# Patient Record
Sex: MALE | Race: WHITE | NOT HISPANIC OR LATINO | Employment: FULL TIME
[De-identification: names, ages, dates, MRNs, and addresses within clinical notes are randomized per-mention and may not be internally consistent; named-entity substitution may affect disease eponyms.]

---

## 2022-03-25 ENCOUNTER — TRANSCRIBE ORDERS (OUTPATIENT)
Dept: OTHER | Age: 67
End: 2022-03-25
Payer: COMMERCIAL

## 2022-03-25 DIAGNOSIS — G89.29 CHRONIC LOW BACK PAIN: Primary | ICD-10-CM

## 2022-03-25 DIAGNOSIS — M54.50 CHRONIC LOW BACK PAIN: Primary | ICD-10-CM

## 2022-03-25 DIAGNOSIS — Z98.890 S/P RIGHT ROTATOR CUFF REPAIR: ICD-10-CM

## 2022-04-06 ENCOUNTER — THERAPY VISIT (OUTPATIENT)
Dept: PHYSICAL THERAPY | Facility: CLINIC | Age: 67
End: 2022-04-06
Payer: COMMERCIAL

## 2022-04-06 DIAGNOSIS — M54.50 CHRONIC LOW BACK PAIN: ICD-10-CM

## 2022-04-06 DIAGNOSIS — G89.29 CHRONIC LOW BACK PAIN: ICD-10-CM

## 2022-04-06 DIAGNOSIS — Z98.890 S/P RIGHT ROTATOR CUFF REPAIR: ICD-10-CM

## 2022-04-06 PROCEDURE — 97161 PT EVAL LOW COMPLEX 20 MIN: CPT | Mod: GP | Performed by: PHYSICAL THERAPIST

## 2022-04-06 PROCEDURE — 97110 THERAPEUTIC EXERCISES: CPT | Mod: GP | Performed by: PHYSICAL THERAPIST

## 2022-04-06 PROCEDURE — 97140 MANUAL THERAPY 1/> REGIONS: CPT | Mod: GP | Performed by: PHYSICAL THERAPIST

## 2022-04-06 NOTE — PROGRESS NOTES
Physical Therapy Initial Evaluation  Subjective:    Patient Health History  Devang English being seen for Back and bilateral leg pain.     Date of Onset: 3 months ago.   Problem occurred: unknown   Pain is reported as 4/10 on pain scale.  General health as reported by patient is fair.  Pertinent medical history includes: fibromyalgia.   Red flags:  None as reported by patient.  Medical allergies: none.   Surgeries include:  Orthopedic surgery. Other surgery history details: right hip fx/plate; back; rotator cuff.    Current medications:  Anti-inflammatory and muscle relaxants. Other medications details: Flomax.    Current occupation is Professor.   Primary job tasks include:  Prolonged standing and driving.                  Therapist Generated HPI Evaluation  Problem details: Patient has chief complaint of bilateral low lumbar pain that radiates intermittently into bilateral buttocks and posterior thighs to knee 3 months ago of insidious onset. He had right RTC repair surgery on Dec 3, 2021, and thinks it may be due to decreased activity post surgery. History of longstanding low back pain with L5-S1 discectomy many years ago; many injections over the past 5 years; fibromyalgia; No numbness or tingling. No leg weakness.  Goals: walk 2-3 miles; golf; ride road bike .         Type of problem:  Lumbar.    This is a recurrent condition.  Condition occurred with:  Insidious onset.    Patient reports pain:  Lumbar spine left and lumbar spine right.  Pain is described as aching and shooting and is intermittent.  Pain radiates to:  Gluteals left, gluteals right, thigh left and thigh right.   Since onset symptoms are gradually improving.  Associated symptoms:  Loss of motion/stiffness. Symptoms are exacerbated by bending, standing, walking, lifting and sitting  and relieved by NSAID's.      Restrictions due to condition include:  Working in normal job without restrictions.  Barriers include:  None as reported by  patient.                        Objective:    Gait:    Gait Type:  Antalgic   Assistive Devices:  None      Flexibility/Screens:   Positive screens:  LumbarNegative screens: Hip     Lower Extremity:  Decreased left lower extremity flexibility:Piriformis; Hamstrings and Gastroc    Decreased right lower extremity flexibility:  Piriformis; Hamstrings and Gastroc               Lumbar/SI Evaluation  ROM:    AROM Lumbar:   Flexion:            70% ERP  Ext:                    50%   Side Bend:        Left:  60%    Right:  60%  Rotation:           Left:  80%    Right:  80%  Side Glide:        Left:     Right:           Lumbar Myotomes:  normal                  Neural Tension/Mobility:  Neural tension wnl lumbar: Severe loss of ROM with SLR and SLR with DF tests.      Left side:SLR; SLR w/DF or Slump  negative.     Right side:   SLR w/DF; Slump or SLR  negative.   Lumbar Palpation:    Tenderness present at Left:    Piriformis and Hamstrings  Tenderness present at Right: Piriformis and Hamstrings                                                     General     ROS    Assessment/Plan:    Patient is a 66 year old male with lumbar complaints.    Patient has the following significant findings with corresponding treatment plan.                Diagnosis 1:  Lumbar radiculopathy  Pain -  education  Decreased ROM/flexibility - manual therapy, therapeutic exercise and home program  Decreased strength - therapeutic exercise, therapeutic activities and home program  Impaired gait - gait training    Therapy Evaluation Codes:   1) History comprised of:   Personal factors that impact the plan of care:      None.    Comorbidity factors that impact the plan of care are:      None.     Medications impacting care: None.  2) Examination of Body Systems comprised of:   Body structures and functions that impact the plan of care:      Lumbar spine.   Activity limitations that impact the plan of care are:      Sitting, Standing, Walking and  Working.  3) Clinical presentation characteristics are:   Stable/Uncomplicated.  4) Decision-Making    Low complexity using standardized patient assessment instrument and/or measureable assessment of functional outcome.  Cumulative Therapy Evaluation is: Low complexity.    Previous and current functional limitations:  (See Goal Flow Sheet for this information)    Short term and Long term goals: (See Goal Flow Sheet for this information)     Communication ability:  Patient appears to be able to clearly communicate and understand verbal and written communication and follow directions correctly.  Treatment Explanation - The following has been discussed with the patient:   RX ordered/plan of care  Anticipated outcomes  Possible risks and side effects  This patient would benefit from PT intervention to resume normal activities.   Rehab potential is excellent.    Frequency:  1 X week, once daily  Duration:  for 6 weeks  Discharge Plan:  Achieve all LTG.  Independent in home treatment program.  Reach maximal therapeutic benefit.    Please refer to the daily flowsheet for treatment today, total treatment time and time spent performing 1:1 timed codes.

## 2022-04-06 NOTE — LETTER
[facility]  [address] [facility] [address]  [phone]    2022    Re: Devang English   :   1955  MRN:  [id number]   REFERRING PHYSICIAN:   Jose ONEIL [facility]    Date of Initial Evaluation: 22  Visits:  Rxs Used: 1  Reason for Referral:     Chronic low back pain  S/P right rotator cuff repair    EVALUATION SUMMARY    Physical Therapy Initial Evaluation  Subjective:    Patient Health History  Devang English being seen for Back and bilateral leg pain.     Date of Onset: 3 months ago.   Problem occurred: unknown   Pain is reported as 4/10 on pain scale.  General health as reported by patient is fair.  Pertinent medical history includes: fibromyalgia.   Red flags:  None as reported by patient.  Medical allergies: none.   Surgeries include:  Orthopedic surgery. Other surgery history details: right hip fx/plate; back; rotator cuff.    Current medications:  Anti-inflammatory and muscle relaxants. Other medications details: Flomax.    Current occupation is Professor.   Primary job tasks include:  Prolonged standing and driving.                  Therapist Generated HPI Evaluation  Problem details: Patient has chief complaint of bilateral low lumbar pain that radiates intermittently into bilateral buttocks and posterior thighs to knee 3 months ago of insidious onset. He had right RTC repair surgery on Dec 3, 2021, and thinks it may be due to decreased activity post surgery. History of longstanding low back pain with L5-S1 discectomy many years ago; many injections over the past 5 years; fibromyalgia; No numbness or tingling. No leg weakness.  Goals: walk 2-3 miles; golf; ride road bike .         Type of problem:  Lumbar.    This is a recurrent condition.  Condition occurred with:  Insidious onset.    Patient reports pain:  Lumbar spine left and lumbar spine right.  Pain is described as aching and shooting  and is intermittent.  Pain radiates to:  Gluteals left, gluteals right, thigh left and thigh right.   Since onset symptoms are gradually improving.  Associated symptoms:  Loss of motion/stiffness. Symptoms are exacerbated by bending, standing, walking, lifting and sitting  and relieved by NSAID's.      Restrictions due to condition include:  Working in normal job without restrictions.  Barriers include:  None as reported by patient.      Objective:    Gait:    Gait Type:  Antalgic   Assistive Devices:  None    Flexibility/Screens:   Positive screens:  LumbarNegative screens: Hip     Lower Extremity:  Decreased left lower extremity flexibility:Piriformis; Hamstrings and Gastroc    Decreased right lower extremity flexibility:  Piriformis; Hamstrings and Gastroc       Lumbar/SI Evaluation  ROM:    AROM Lumbar:   Flexion:            70% ERP  Ext:                    50%   Side Bend:        Left:  60%    Right:  60%  Rotation:           Left:  80%    Right:  80%  Side Glide:        Left:     Right:           Lumbar Myotomes:  normal    Neural Tension/Mobility:  Neural tension wnl lumbar: Severe loss of ROM with SLR and SLR with DF tests.      Left side:SLR; SLR w/DF or Slump  negative.     Right side:   SLR w/DF; Slump or SLR  negative.   Lumbar Palpation:    Tenderness present at Left:    Piriformis and Hamstrings  Tenderness present at Right: Piriformis and Hamstrings    Assessment/Plan:    Patient is a 66 year old male with lumbar complaints.    Patient has the following significant findings with corresponding treatment plan.                Diagnosis 1:  Lumbar radiculopathy  Pain -  education  Decreased ROM/flexibility - manual therapy, therapeutic exercise and home program  Decreased strength - therapeutic exercise, therapeutic activities and home program  Impaired gait - gait training    Therapy Evaluation Codes:   1) History comprised of:   Personal factors that impact the plan of care:      None.    Comorbidity  factors that impact the plan of care are:      None.     Medications impacting care: None.  2) Examination of Body Systems comprised of:   Body structures and functions that impact the plan of care:      Lumbar spine.   Activity limitations that impact the plan of care are:      Sitting, Standing, Walking and Working.  3) Clinical presentation characteristics are:   Stable/Uncomplicated.  4) Decision-Making    Low complexity using standardized patient assessment instrument and/or measureable assessment of functional outcome.  Cumulative Therapy Evaluation is: Low complexity.    Previous and current functional limitations:  (See Goal Flow Sheet for this information)    Short term and Long term goals: (See Goal Flow Sheet for this information)     Communication ability:  Patient appears to be able to clearly communicate and understand verbal and written communication and follow directions correctly.  Treatment Explanation - The following has been discussed with the patient:   RX ordered/plan of care  Anticipated outcomes  Possible risks and side effects  This patient would benefit from PT intervention to resume normal activities.   Rehab potential is excellent.    Frequency:  1 X week, once daily  Duration:  for 6 weeks  Discharge Plan:  Achieve all LTG.  Independent in home treatment program.  Reach maximal therapeutic benefit.    Thank you for your referral.    INQUIRIES  Therapist: LISSET AVILES [facility]  [address] [facility] [address]  Phone: [phone]  Fax: [phone]

## 2022-04-13 ENCOUNTER — THERAPY VISIT (OUTPATIENT)
Dept: PHYSICAL THERAPY | Facility: CLINIC | Age: 67
End: 2022-04-13
Payer: COMMERCIAL

## 2022-04-13 DIAGNOSIS — M54.50 CHRONIC LOW BACK PAIN: Primary | ICD-10-CM

## 2022-04-13 DIAGNOSIS — G89.29 CHRONIC LOW BACK PAIN: Primary | ICD-10-CM

## 2022-04-13 PROCEDURE — 97110 THERAPEUTIC EXERCISES: CPT | Mod: GP | Performed by: PHYSICAL THERAPIST

## 2022-04-13 PROCEDURE — 97140 MANUAL THERAPY 1/> REGIONS: CPT | Mod: GP | Performed by: PHYSICAL THERAPIST

## 2022-04-20 ENCOUNTER — THERAPY VISIT (OUTPATIENT)
Dept: PHYSICAL THERAPY | Facility: CLINIC | Age: 67
End: 2022-04-20
Payer: COMMERCIAL

## 2022-04-20 DIAGNOSIS — G89.29 CHRONIC LOW BACK PAIN: Primary | ICD-10-CM

## 2022-04-20 DIAGNOSIS — M54.50 CHRONIC LOW BACK PAIN: Primary | ICD-10-CM

## 2022-04-20 PROCEDURE — 97140 MANUAL THERAPY 1/> REGIONS: CPT | Mod: GP | Performed by: PHYSICAL THERAPIST

## 2022-04-20 NOTE — PROGRESS NOTES
Subjective:  HPI  Physical Exam  Oswestry Score: 40 %                 Objective:  System    Physical Exam    General     ROS    Assessment/Plan:    DISCHARGE REPORT    Progress reporting period is from 4/6/2022 to 4/20/2022.     SUBJECTIVE    Subjective: Gradual improvement in back and hip pain. Yoga classes and home PT stretches helpful.   Current Pain level: 1/10   Initial Pain level: 4/10     OBJECTIVE  Objective: Significant decrease in tightness in right piriformis mm. Home stretches and deep self massage with golf ball helpful.      ASSESSMENT/PLAN    STG/LTGs have been met or progress has been made towards goals:  Yes (See Goal flow sheet completed today.)  Assessment of Progress: The patient's condition is improving.  The patient's condition has potential to improve.  Self Management Plans:  Patient is independent in a home treatment program.  Patient is independent in self management of symptoms.    Devang continues to require the following intervention to meet STG and LTG's: PT intervention is no longer required to meet STG/LTG.    Recommendations:    This patient is ready to be discharged from therapy and continue their home treatment program.    Please refer to the daily flowsheet for treatment today, total treatment time and time spent performing 1:1 timed codes.

## 2022-04-20 NOTE — LETTER
[facility]  [address] [facility] [address] [phone]    2022    Re: Devang English   :   1955  MRN:  [id number]   REFERRING PHYSICIAN:   Jose ONEIL [facility]    Date of Initial Evaluation: 22  Visits:  Rxs Used: 3  Reason for Referral:  Chronic low back pain    EVALUATION SUMMARY    Subjective:  HPI  Physical Exam  Oswestry Score: 40 %                 Assessment/Plan:    DISCHARGE REPORT    Progress reporting period is from 2022 to 2022.     SUBJECTIVE    Subjective: Gradual improvement in back and hip pain. Yoga classes and home PT stretches helpful.   Current Pain level: 1/10   Initial Pain level: 4/10     OBJECTIVE  Objective: Significant decrease in tightness in right piriformis mm. Home stretches and deep self massage with golf ball helpful.      ASSESSMENT/PLAN    STG/LTGs have been met or progress has been made towards goals:  Yes (See Goal flow sheet completed today.)  Assessment of Progress: The patient's condition is improving.  The patient's condition has potential to improve.  Self Management Plans:  Patient is independent in a home treatment program.  Patient is independent in self management of symptoms.    Devang continues to require the following intervention to meet STG and LTG's: PT intervention is no longer required to meet STG/LTG.  Re: Devang English   :   1955    Recommendations:    This patient is ready to be discharged from therapy and continue their home treatment program.    Thank you for your referral.    INQUIRIES  Therapist: LISSET AVILES [facility]  [id number] [place] [address]  Phone: [phone]  Fax: [phone]

## 2024-12-02 RX ORDER — ALPRAZOLAM 0.25 MG
0.25 TABLET ORAL 3 TIMES DAILY PRN
Status: ON HOLD | COMMUNITY
End: 2025-04-22

## 2024-12-02 RX ORDER — PRAMIPEXOLE DIHYDROCHLORIDE 0.25 MG/1
.25-.5 TABLET ORAL
COMMUNITY

## 2024-12-02 RX ORDER — LOSARTAN POTASSIUM 50 MG/1
50 TABLET ORAL DAILY
Status: ON HOLD | COMMUNITY
End: 2025-04-22

## 2024-12-02 RX ORDER — ROSUVASTATIN CALCIUM 40 MG/1
40 TABLET, COATED ORAL AT BEDTIME
COMMUNITY

## 2024-12-02 RX ORDER — TAMSULOSIN HYDROCHLORIDE 0.4 MG/1
0.8 CAPSULE ORAL EVERY EVENING
COMMUNITY

## 2024-12-02 RX ORDER — GABAPENTIN 300 MG/1
600 CAPSULE ORAL 2 TIMES DAILY
Status: ON HOLD | COMMUNITY
End: 2025-04-23

## 2024-12-02 RX ORDER — SERTRALINE HYDROCHLORIDE 100 MG/1
100 TABLET, FILM COATED ORAL DAILY
COMMUNITY

## 2025-03-27 NOTE — PROGRESS NOTES
Discharge plan according to [facility]:     12/02/24 7751   Discharge Planning   Patient/Family Anticipates Transition to home with family   Living Arrangements   People in Home spouse   Type of Residence Private Residence   Is your private residence a single family home or apartment? Single family home   Number of Stairs, Within Home, Primary none   Bathroom Shower/Tub Walk-in shower   Equipment Currently Used at Home none   Support System   Support Systems Spouse/Significant Other   Do you have someone available to stay with you one or two nights once you are home? Yes
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
## 2025-04-17 RX ORDER — IBUPROFEN 200 MG
200 TABLET ORAL EVERY 4 HOURS PRN
Status: ON HOLD | COMMUNITY
End: 2025-04-23

## 2025-04-17 RX ORDER — OXYCODONE HYDROCHLORIDE 5 MG/1
5 CAPSULE ORAL EVERY 4 HOURS PRN
Status: ON HOLD | COMMUNITY
End: 2025-04-22

## 2025-04-18 NOTE — PROGRESS NOTES
I am evaluating this patient for upcoming Right Posterior Total Hip Arthroplasty with Dr. Jones at [facility] on 4/22/25:    -Reviewed preop H&P and recent labs. Cleared by PCP for surgery. Medical history significant for:    1) Sinus Bradycardia: Not new. EKG at preop exam on 4/9/25 showed sinus bradycardia.  Right bundle branch block. No significant change from previous EKG. Patient does have history of bradycardia with heart rates commonly and 40-60 bpm range.  Denies any chest pain/chest discomfort, CRAMER, shortness of breath, palpitations, dizziness/lightheadedness.  Functional capacity >4 METS.  Cleared by PCP for surgery without further testing or evaluation.  2) Hyperlipidemia: On rosuvastatin.  3) Hypertension: Appears relatively well-controlled on losartan.  Patient was instructed to hold losartan on the morning of surgery.  4) Chronic Low Back Pain and Fibromyalgia: S/P Spinal Stimulator placement: On ibuprofen, gabapentin and oxycodone.  I will order Inpatient Pain Team Consult to assist with post-op pain management plan given this history. Their recommendations and assistance are greatly appreciated.     5) Restless Leg Syndrome: On pramipexole.  6) Anxiety: On alprazolam.  7) Benign Prostatic Hyperplasia (BPH): On tamsulosin.  May have increased risk for postop urinary retention.  I recommend continuing on tamsulosin perioperatively and monitoring for urine retention with bladder scanning as needed.     Patient appears medically optimized for surgery.  Discharge plan below.    Discharge plan according to [facility]:    Home morning of POD 1 with assist of Spouse     12/02/24 4240   Discharge Planning   Patient/Family Anticipates Transition to home with family   Living Arrangements   People in Home spouse   Type of Residence Private Residence   Is your private residence a single family home or apartment? Single family home   Number of Stairs, Within Home, Primary none   Bathroom Shower/Tub  Walk-in shower   Equipment Currently Used at Home none   Support System   Support Systems Spouse/Significant Other   Do you have someone available to stay with you one or two nights once you are home? Yes       MARIE Samayoa, CNP   Advanced Practice Nurse Navigator- Orthopedics  [facility]   Office Phone: [phone]  Direct Fax: [phone]

## 2025-04-21 ENCOUNTER — ANESTHESIA EVENT (OUTPATIENT)
Dept: SURGERY | Facility: CLINIC | Age: 70
End: 2025-04-21
Payer: COMMERCIAL

## 2025-04-22 ENCOUNTER — APPOINTMENT (OUTPATIENT)
Dept: RADIOLOGY | Facility: CLINIC | Age: 70
End: 2025-04-22
Attending: ORTHOPAEDIC SURGERY
Payer: COMMERCIAL

## 2025-04-22 ENCOUNTER — APPOINTMENT (OUTPATIENT)
Dept: PHYSICAL THERAPY | Facility: CLINIC | Age: 70
End: 2025-04-22
Attending: ORTHOPAEDIC SURGERY
Payer: COMMERCIAL

## 2025-04-22 ENCOUNTER — HOSPITAL ENCOUNTER (OUTPATIENT)
Facility: CLINIC | Age: 70
Discharge: HOME | End: 2025-04-23
Attending: ORTHOPAEDIC SURGERY | Admitting: ORTHOPAEDIC SURGERY
Payer: COMMERCIAL

## 2025-04-22 ENCOUNTER — ANESTHESIA (OUTPATIENT)
Dept: SURGERY | Facility: CLINIC | Age: 70
End: 2025-04-22
Payer: COMMERCIAL

## 2025-04-22 DIAGNOSIS — Z98.890 HISTORY OF HIP SURGERY: Primary | ICD-10-CM

## 2025-04-22 DIAGNOSIS — M79.7 FIBROMYALGIA: ICD-10-CM

## 2025-04-22 DIAGNOSIS — M16.51 POST-TRAUMATIC OSTEOARTHRITIS OF RIGHT HIP: ICD-10-CM

## 2025-04-22 PROBLEM — M16.50: Status: ACTIVE | Noted: 2025-04-22

## 2025-04-22 PROBLEM — I10 ESSENTIAL HYPERTENSION: Status: ACTIVE | Noted: 2024-12-17

## 2025-04-22 PROBLEM — E29.1 MALE HYPOGONADISM: Status: ACTIVE | Noted: 2023-11-08

## 2025-04-22 PROBLEM — G47.00 INSOMNIA: Status: ACTIVE | Noted: 2020-05-27

## 2025-04-22 PROBLEM — F43.22 ADJUSTMENT DISORDER WITH ANXIETY: Status: ACTIVE | Noted: 2023-10-16

## 2025-04-22 PROBLEM — F41.9 ANXIETY: Status: ACTIVE | Noted: 2020-05-27

## 2025-04-22 PROBLEM — G25.81 RESTLESS LEG SYNDROME: Status: ACTIVE | Noted: 2020-05-27

## 2025-04-22 PROBLEM — F33.1 MODERATE EPISODE OF RECURRENT MAJOR DEPRESSIVE DISORDER (H): Status: ACTIVE | Noted: 2022-02-14

## 2025-04-22 PROBLEM — N40.1 BENIGN LOCALIZED PROSTATIC HYPERPLASIA WITH LOWER URINARY TRACT SYMPTOMS (LUTS): Status: ACTIVE | Noted: 2023-11-08

## 2025-04-22 PROBLEM — R79.89 ELEVATED TSH: Status: ACTIVE | Noted: 2023-11-08

## 2025-04-22 PROBLEM — J30.9 ALLERGIC RHINITIS: Status: ACTIVE | Noted: 2025-04-22

## 2025-04-22 PROBLEM — E78.5 HYPERLIPIDEMIA WITH TARGET LOW DENSITY LIPOPROTEIN (LDL) CHOLESTEROL LESS THAN 70 MG/DL: Status: ACTIVE | Noted: 2022-07-19

## 2025-04-22 PROBLEM — N41.1 CHRONIC PROSTATITIS: Status: ACTIVE | Noted: 2019-02-03

## 2025-04-22 PROCEDURE — 258N000003 HC RX IP 258 OP 636: Performed by: NURSE ANESTHETIST, CERTIFIED REGISTERED

## 2025-04-22 PROCEDURE — 97161 PT EVAL LOW COMPLEX 20 MIN: CPT | Mod: GP

## 2025-04-22 PROCEDURE — 250N000011 HC RX IP 250 OP 636: Performed by: NURSE ANESTHETIST, CERTIFIED REGISTERED

## 2025-04-22 PROCEDURE — 250N000011 HC RX IP 250 OP 636: Mod: JZ | Performed by: ORTHOPAEDIC SURGERY

## 2025-04-22 PROCEDURE — 250N000013 HC RX MED GY IP 250 OP 250 PS 637: Performed by: ORTHOPAEDIC SURGERY

## 2025-04-22 PROCEDURE — 258N000003 HC RX IP 258 OP 636: Performed by: STUDENT IN AN ORGANIZED HEALTH CARE EDUCATION/TRAINING PROGRAM

## 2025-04-22 PROCEDURE — 370N000017 HC ANESTHESIA TECHNICAL FEE, PER MIN: Performed by: ORTHOPAEDIC SURGERY

## 2025-04-22 PROCEDURE — 999N000065 XR PELVIS AND HIP PORTABLE RIGHT 1 VIEW

## 2025-04-22 PROCEDURE — 99204 OFFICE O/P NEW MOD 45 MIN: CPT | Performed by: FAMILY MEDICINE

## 2025-04-22 PROCEDURE — 250N000013 HC RX MED GY IP 250 OP 250 PS 637: Performed by: PHYSICIAN ASSISTANT

## 2025-04-22 PROCEDURE — 250N000009 HC RX 250: Performed by: NURSE ANESTHETIST, CERTIFIED REGISTERED

## 2025-04-22 PROCEDURE — 999N000141 HC STATISTIC PRE-PROCEDURE NURSING ASSESSMENT: Performed by: ORTHOPAEDIC SURGERY

## 2025-04-22 PROCEDURE — 250N000011 HC RX IP 250 OP 636: Mod: JZ | Performed by: PHYSICIAN ASSISTANT

## 2025-04-22 PROCEDURE — 250N000011 HC RX IP 250 OP 636: Performed by: PHYSICIAN ASSISTANT

## 2025-04-22 PROCEDURE — 272N000001 HC OR GENERAL SUPPLY STERILE: Performed by: ORTHOPAEDIC SURGERY

## 2025-04-22 PROCEDURE — P9045 ALBUMIN (HUMAN), 5%, 250 ML: HCPCS | Performed by: NURSE ANESTHETIST, CERTIFIED REGISTERED

## 2025-04-22 PROCEDURE — 97110 THERAPEUTIC EXERCISES: CPT | Mod: GP

## 2025-04-22 PROCEDURE — 99204 OFFICE O/P NEW MOD 45 MIN: CPT | Performed by: PHYSICIAN ASSISTANT

## 2025-04-22 PROCEDURE — 250N000011 HC RX IP 250 OP 636: Mod: JW | Performed by: STUDENT IN AN ORGANIZED HEALTH CARE EDUCATION/TRAINING PROGRAM

## 2025-04-22 PROCEDURE — 360N000077 HC SURGERY LEVEL 4, PER MIN: Performed by: ORTHOPAEDIC SURGERY

## 2025-04-22 PROCEDURE — 258N000003 HC RX IP 258 OP 636: Performed by: PHYSICIAN ASSISTANT

## 2025-04-22 PROCEDURE — C1776 JOINT DEVICE (IMPLANTABLE): HCPCS | Performed by: ORTHOPAEDIC SURGERY

## 2025-04-22 PROCEDURE — 258N000003 HC RX IP 258 OP 636: Performed by: ORTHOPAEDIC SURGERY

## 2025-04-22 PROCEDURE — 258N000001 HC RX 258: Performed by: ORTHOPAEDIC SURGERY

## 2025-04-22 PROCEDURE — 999N000063 XR HIP RIGHT 1 VIEW: Mod: RT

## 2025-04-22 PROCEDURE — 250N000009 HC RX 250: Performed by: STUDENT IN AN ORGANIZED HEALTH CARE EDUCATION/TRAINING PROGRAM

## 2025-04-22 PROCEDURE — 710N000010 HC RECOVERY PHASE 1, LEVEL 2, PER MIN: Performed by: ORTHOPAEDIC SURGERY

## 2025-04-22 PROCEDURE — 250N000013 HC RX MED GY IP 250 OP 250 PS 637: Performed by: FAMILY MEDICINE

## 2025-04-22 PROCEDURE — 97116 GAIT TRAINING THERAPY: CPT | Mod: GP

## 2025-04-22 DEVICE — CERAMIC V40 FEMORAL HEAD
Type: IMPLANTABLE DEVICE | Site: HIP | Status: FUNCTIONAL
Brand: BIOLOX

## 2025-04-22 DEVICE — 0 DEGREE POLYETHYLENE INSERT
Type: IMPLANTABLE DEVICE | Site: HIP | Status: FUNCTIONAL
Brand: TRIDENT

## 2025-04-22 DEVICE — TRIDENT II TRITANIUM CLUSTER 52E
Type: IMPLANTABLE DEVICE | Site: HIP | Status: FUNCTIONAL
Brand: TRIDENT II

## 2025-04-22 DEVICE — HIP STEM - HIGH OFFSET
Type: IMPLANTABLE DEVICE | Site: HIP | Status: FUNCTIONAL
Brand: INSIGNIA

## 2025-04-22 RX ORDER — HYDRALAZINE HYDROCHLORIDE 20 MG/ML
2.5-5 INJECTION INTRAMUSCULAR; INTRAVENOUS EVERY 10 MIN PRN
Status: DISCONTINUED | OUTPATIENT
Start: 2025-04-22 | End: 2025-04-22 | Stop reason: HOSPADM

## 2025-04-22 RX ORDER — AMLODIPINE BESYLATE 5 MG/1
5 TABLET ORAL DAILY
Status: DISCONTINUED | OUTPATIENT
Start: 2025-04-23 | End: 2025-04-23 | Stop reason: HOSPADM

## 2025-04-22 RX ORDER — ONDANSETRON 4 MG/1
4 TABLET, ORALLY DISINTEGRATING ORAL EVERY 6 HOURS PRN
Status: DISCONTINUED | OUTPATIENT
Start: 2025-04-22 | End: 2025-04-23 | Stop reason: HOSPADM

## 2025-04-22 RX ORDER — NALOXONE HYDROCHLORIDE 0.4 MG/ML
0.4 INJECTION, SOLUTION INTRAMUSCULAR; INTRAVENOUS; SUBCUTANEOUS
Status: DISCONTINUED | OUTPATIENT
Start: 2025-04-22 | End: 2025-04-23 | Stop reason: HOSPADM

## 2025-04-22 RX ORDER — KETOROLAC TROMETHAMINE 15 MG/ML
15 INJECTION, SOLUTION INTRAMUSCULAR; INTRAVENOUS EVERY 6 HOURS
Status: COMPLETED | OUTPATIENT
Start: 2025-04-22 | End: 2025-04-23

## 2025-04-22 RX ORDER — DEXAMETHASONE SODIUM PHOSPHATE 10 MG/ML
INJECTION, SOLUTION INTRAMUSCULAR; INTRAVENOUS PRN
Status: DISCONTINUED | OUTPATIENT
Start: 2025-04-22 | End: 2025-04-22

## 2025-04-22 RX ORDER — LIDOCAINE 40 MG/G
CREAM TOPICAL
Status: DISCONTINUED | OUTPATIENT
Start: 2025-04-22 | End: 2025-04-23 | Stop reason: HOSPADM

## 2025-04-22 RX ORDER — TAMSULOSIN HYDROCHLORIDE 0.4 MG/1
0.8 CAPSULE ORAL EVERY EVENING
Status: DISCONTINUED | OUTPATIENT
Start: 2025-04-22 | End: 2025-04-23 | Stop reason: HOSPADM

## 2025-04-22 RX ORDER — FENTANYL CITRATE 50 UG/ML
25 INJECTION, SOLUTION INTRAMUSCULAR; INTRAVENOUS EVERY 5 MIN PRN
Status: DISCONTINUED | OUTPATIENT
Start: 2025-04-22 | End: 2025-04-22 | Stop reason: HOSPADM

## 2025-04-22 RX ORDER — ONDANSETRON 2 MG/ML
4 INJECTION INTRAMUSCULAR; INTRAVENOUS EVERY 30 MIN PRN
Status: CANCELLED | OUTPATIENT
Start: 2025-04-22

## 2025-04-22 RX ORDER — DEXAMETHASONE SODIUM PHOSPHATE 4 MG/ML
4 INJECTION, SOLUTION INTRA-ARTICULAR; INTRALESIONAL; INTRAMUSCULAR; INTRAVENOUS; SOFT TISSUE
Status: DISCONTINUED | OUTPATIENT
Start: 2025-04-22 | End: 2025-04-22 | Stop reason: HOSPADM

## 2025-04-22 RX ORDER — GLYCOPYRROLATE 0.2 MG/ML
INJECTION, SOLUTION INTRAMUSCULAR; INTRAVENOUS PRN
Status: DISCONTINUED | OUTPATIENT
Start: 2025-04-22 | End: 2025-04-22

## 2025-04-22 RX ORDER — HYDROMORPHONE HCL IN WATER/PF 6 MG/30 ML
0.4 PATIENT CONTROLLED ANALGESIA SYRINGE INTRAVENOUS EVERY 5 MIN PRN
Status: DISCONTINUED | OUTPATIENT
Start: 2025-04-22 | End: 2025-04-22 | Stop reason: HOSPADM

## 2025-04-22 RX ORDER — AMLODIPINE BESYLATE 5 MG/1
5 TABLET ORAL DAILY
COMMUNITY

## 2025-04-22 RX ORDER — TRANEXAMIC ACID 650 MG/1
1950 TABLET ORAL ONCE
Status: COMPLETED | OUTPATIENT
Start: 2025-04-22 | End: 2025-04-22

## 2025-04-22 RX ORDER — NALOXONE HYDROCHLORIDE 0.4 MG/ML
0.1 INJECTION, SOLUTION INTRAMUSCULAR; INTRAVENOUS; SUBCUTANEOUS
Status: DISCONTINUED | OUTPATIENT
Start: 2025-04-22 | End: 2025-04-22 | Stop reason: HOSPADM

## 2025-04-22 RX ORDER — PRAMIPEXOLE DIHYDROCHLORIDE 0.25 MG/1
.25-.5 TABLET ORAL
Status: DISCONTINUED | OUTPATIENT
Start: 2025-04-22 | End: 2025-04-23 | Stop reason: HOSPADM

## 2025-04-22 RX ORDER — HYDROMORPHONE HCL IN WATER/PF 6 MG/30 ML
0.1 PATIENT CONTROLLED ANALGESIA SYRINGE INTRAVENOUS EVERY 4 HOURS PRN
Status: DISCONTINUED | OUTPATIENT
Start: 2025-04-22 | End: 2025-04-22

## 2025-04-22 RX ORDER — PROPOFOL 10 MG/ML
INJECTION, EMULSION INTRAVENOUS CONTINUOUS PRN
Status: DISCONTINUED | OUTPATIENT
Start: 2025-04-22 | End: 2025-04-22

## 2025-04-22 RX ORDER — AMOXICILLIN 250 MG
1 CAPSULE ORAL 2 TIMES DAILY
Status: DISCONTINUED | OUTPATIENT
Start: 2025-04-22 | End: 2025-04-23 | Stop reason: HOSPADM

## 2025-04-22 RX ORDER — ONDANSETRON 4 MG/1
4 TABLET, ORALLY DISINTEGRATING ORAL EVERY 30 MIN PRN
Status: CANCELLED | OUTPATIENT
Start: 2025-04-22

## 2025-04-22 RX ORDER — FENTANYL CITRATE 0.05 MG/ML
INJECTION, SOLUTION INTRAMUSCULAR; INTRAVENOUS PRN
Status: DISCONTINUED | OUTPATIENT
Start: 2025-04-22 | End: 2025-04-22

## 2025-04-22 RX ORDER — ONDANSETRON 2 MG/ML
4 INJECTION INTRAMUSCULAR; INTRAVENOUS EVERY 6 HOURS PRN
Status: DISCONTINUED | OUTPATIENT
Start: 2025-04-22 | End: 2025-04-23 | Stop reason: HOSPADM

## 2025-04-22 RX ORDER — EPHEDRINE SULFATE 50 MG/ML
INJECTION, SOLUTION INTRAMUSCULAR; INTRAVENOUS; SUBCUTANEOUS PRN
Status: DISCONTINUED | OUTPATIENT
Start: 2025-04-22 | End: 2025-04-22

## 2025-04-22 RX ORDER — BISACODYL 10 MG
10 SUPPOSITORY, RECTAL RECTAL DAILY PRN
Status: DISCONTINUED | OUTPATIENT
Start: 2025-04-22 | End: 2025-04-23 | Stop reason: HOSPADM

## 2025-04-22 RX ORDER — NALOXONE HYDROCHLORIDE 0.4 MG/ML
0.2 INJECTION, SOLUTION INTRAMUSCULAR; INTRAVENOUS; SUBCUTANEOUS
Status: DISCONTINUED | OUTPATIENT
Start: 2025-04-22 | End: 2025-04-23 | Stop reason: HOSPADM

## 2025-04-22 RX ORDER — LIDOCAINE 40 MG/G
CREAM TOPICAL
Status: DISCONTINUED | OUTPATIENT
Start: 2025-04-22 | End: 2025-04-22 | Stop reason: HOSPADM

## 2025-04-22 RX ORDER — HYDROMORPHONE HCL IN WATER/PF 6 MG/30 ML
0.2 PATIENT CONTROLLED ANALGESIA SYRINGE INTRAVENOUS EVERY 5 MIN PRN
Status: DISCONTINUED | OUTPATIENT
Start: 2025-04-22 | End: 2025-04-22 | Stop reason: HOSPADM

## 2025-04-22 RX ORDER — ROSUVASTATIN CALCIUM 10 MG/1
40 TABLET, COATED ORAL AT BEDTIME
Status: DISCONTINUED | OUTPATIENT
Start: 2025-04-22 | End: 2025-04-23 | Stop reason: HOSPADM

## 2025-04-22 RX ORDER — ONDANSETRON 2 MG/ML
4 INJECTION INTRAMUSCULAR; INTRAVENOUS EVERY 30 MIN PRN
Status: DISCONTINUED | OUTPATIENT
Start: 2025-04-22 | End: 2025-04-22 | Stop reason: HOSPADM

## 2025-04-22 RX ORDER — NALOXONE HYDROCHLORIDE 0.4 MG/ML
0.1 INJECTION, SOLUTION INTRAMUSCULAR; INTRAVENOUS; SUBCUTANEOUS
Status: CANCELLED | OUTPATIENT
Start: 2025-04-22

## 2025-04-22 RX ORDER — HALOPERIDOL 5 MG/ML
1 INJECTION INTRAMUSCULAR
Status: DISCONTINUED | OUTPATIENT
Start: 2025-04-22 | End: 2025-04-22 | Stop reason: HOSPADM

## 2025-04-22 RX ORDER — BUPIVACAINE HYDROCHLORIDE 5 MG/ML
INJECTION, SOLUTION EPIDURAL; INTRACAUDAL; PERINEURAL
Status: COMPLETED | OUTPATIENT
Start: 2025-04-22 | End: 2025-04-22

## 2025-04-22 RX ORDER — LABETALOL HYDROCHLORIDE 5 MG/ML
10 INJECTION, SOLUTION INTRAVENOUS
Status: DISCONTINUED | OUTPATIENT
Start: 2025-04-22 | End: 2025-04-22 | Stop reason: HOSPADM

## 2025-04-22 RX ORDER — HYDROMORPHONE HYDROCHLORIDE 2 MG/1
2-4 TABLET ORAL EVERY 4 HOURS PRN
Status: DISCONTINUED | OUTPATIENT
Start: 2025-04-22 | End: 2025-04-23 | Stop reason: HOSPADM

## 2025-04-22 RX ORDER — OXYCODONE HYDROCHLORIDE 5 MG/1
5 TABLET ORAL
Status: CANCELLED | OUTPATIENT
Start: 2025-04-22

## 2025-04-22 RX ORDER — FENTANYL CITRATE 50 UG/ML
50 INJECTION, SOLUTION INTRAMUSCULAR; INTRAVENOUS EVERY 5 MIN PRN
Status: DISCONTINUED | OUTPATIENT
Start: 2025-04-22 | End: 2025-04-22 | Stop reason: HOSPADM

## 2025-04-22 RX ORDER — SODIUM CHLORIDE, SODIUM LACTATE, POTASSIUM CHLORIDE, CALCIUM CHLORIDE 600; 310; 30; 20 MG/100ML; MG/100ML; MG/100ML; MG/100ML
INJECTION, SOLUTION INTRAVENOUS CONTINUOUS
Status: DISCONTINUED | OUTPATIENT
Start: 2025-04-22 | End: 2025-04-22 | Stop reason: HOSPADM

## 2025-04-22 RX ORDER — OXYCODONE HYDROCHLORIDE 5 MG/1
5 TABLET ORAL EVERY 4 HOURS PRN
Status: DISCONTINUED | OUTPATIENT
Start: 2025-04-22 | End: 2025-04-22

## 2025-04-22 RX ORDER — HYDROXYZINE HYDROCHLORIDE 10 MG/1
10 TABLET, FILM COATED ORAL EVERY 6 HOURS PRN
Status: DISCONTINUED | OUTPATIENT
Start: 2025-04-22 | End: 2025-04-23

## 2025-04-22 RX ORDER — CEFAZOLIN SODIUM 2 G/50ML
2 SOLUTION INTRAVENOUS EVERY 8 HOURS
Status: COMPLETED | OUTPATIENT
Start: 2025-04-22 | End: 2025-04-22

## 2025-04-22 RX ORDER — PROCHLORPERAZINE MALEATE 5 MG/1
5 TABLET ORAL EVERY 6 HOURS PRN
Status: DISCONTINUED | OUTPATIENT
Start: 2025-04-22 | End: 2025-04-23 | Stop reason: HOSPADM

## 2025-04-22 RX ORDER — OXYCODONE HYDROCHLORIDE 5 MG/1
10 TABLET ORAL
Status: CANCELLED | OUTPATIENT
Start: 2025-04-22

## 2025-04-22 RX ORDER — GABAPENTIN 300 MG/1
600 CAPSULE ORAL 2 TIMES DAILY
Status: DISCONTINUED | OUTPATIENT
Start: 2025-04-22 | End: 2025-04-23

## 2025-04-22 RX ORDER — ACETAMINOPHEN 325 MG/1
975 TABLET ORAL EVERY 8 HOURS
Status: DISCONTINUED | OUTPATIENT
Start: 2025-04-22 | End: 2025-04-23 | Stop reason: HOSPADM

## 2025-04-22 RX ORDER — ONDANSETRON 4 MG/1
4 TABLET, ORALLY DISINTEGRATING ORAL EVERY 30 MIN PRN
Status: DISCONTINUED | OUTPATIENT
Start: 2025-04-22 | End: 2025-04-22 | Stop reason: HOSPADM

## 2025-04-22 RX ORDER — DEXAMETHASONE SODIUM PHOSPHATE 10 MG/ML
4 INJECTION, SOLUTION INTRAMUSCULAR; INTRAVENOUS
Status: CANCELLED | OUTPATIENT
Start: 2025-04-22

## 2025-04-22 RX ORDER — POLYETHYLENE GLYCOL 3350 17 G/17G
17 POWDER, FOR SOLUTION ORAL DAILY
Status: DISCONTINUED | OUTPATIENT
Start: 2025-04-23 | End: 2025-04-23 | Stop reason: HOSPADM

## 2025-04-22 RX ORDER — SERTRALINE HYDROCHLORIDE 100 MG/1
100 TABLET, FILM COATED ORAL DAILY
Status: DISCONTINUED | OUTPATIENT
Start: 2025-04-23 | End: 2025-04-23 | Stop reason: HOSPADM

## 2025-04-22 RX ORDER — CEFAZOLIN SODIUM/WATER 2 G/20 ML
2 SYRINGE (ML) INTRAVENOUS SEE ADMIN INSTRUCTIONS
Status: DISCONTINUED | OUTPATIENT
Start: 2025-04-22 | End: 2025-04-22 | Stop reason: HOSPADM

## 2025-04-22 RX ORDER — ASPIRIN 81 MG/1
81 TABLET ORAL 2 TIMES DAILY
Status: DISCONTINUED | OUTPATIENT
Start: 2025-04-22 | End: 2025-04-23 | Stop reason: HOSPADM

## 2025-04-22 RX ORDER — VITAMIN K2 90 MCG
1 CAPSULE ORAL DAILY
COMMUNITY

## 2025-04-22 RX ORDER — CEFADROXIL 500 MG/1
500 CAPSULE ORAL 2 TIMES DAILY
Qty: 28 CAPSULE | Refills: 0 | Status: SHIPPED | OUTPATIENT
Start: 2025-04-22

## 2025-04-22 RX ORDER — HYDROMORPHONE HCL IN WATER/PF 6 MG/30 ML
0.2 PATIENT CONTROLLED ANALGESIA SYRINGE INTRAVENOUS EVERY 4 HOURS PRN
Status: DISCONTINUED | OUTPATIENT
Start: 2025-04-22 | End: 2025-04-23

## 2025-04-22 RX ORDER — CEFAZOLIN SODIUM/WATER 2 G/20 ML
2 SYRINGE (ML) INTRAVENOUS
Status: COMPLETED | OUTPATIENT
Start: 2025-04-22 | End: 2025-04-22

## 2025-04-22 RX ORDER — METHOCARBAMOL 500 MG/1
250 TABLET ORAL EVERY 6 HOURS PRN
Status: DISCONTINUED | OUTPATIENT
Start: 2025-04-22 | End: 2025-04-22

## 2025-04-22 RX ORDER — ONDANSETRON 2 MG/ML
INJECTION INTRAMUSCULAR; INTRAVENOUS PRN
Status: DISCONTINUED | OUTPATIENT
Start: 2025-04-22 | End: 2025-04-22

## 2025-04-22 RX ORDER — SODIUM CHLORIDE, SODIUM LACTATE, POTASSIUM CHLORIDE, CALCIUM CHLORIDE 600; 310; 30; 20 MG/100ML; MG/100ML; MG/100ML; MG/100ML
INJECTION, SOLUTION INTRAVENOUS CONTINUOUS
Status: DISCONTINUED | OUTPATIENT
Start: 2025-04-22 | End: 2025-04-23 | Stop reason: HOSPADM

## 2025-04-22 RX ORDER — CYCLOBENZAPRINE HCL 5 MG
5 TABLET ORAL 3 TIMES DAILY PRN
Status: DISCONTINUED | OUTPATIENT
Start: 2025-04-22 | End: 2025-04-23 | Stop reason: HOSPADM

## 2025-04-22 RX ORDER — VITAMIN B COMPLEX
1 TABLET ORAL DAILY
COMMUNITY

## 2025-04-22 RX ADMIN — DEXAMETHASONE SODIUM PHOSPHATE 10 MG: 10 INJECTION, SOLUTION INTRAMUSCULAR; INTRAVENOUS at 07:25

## 2025-04-22 RX ADMIN — MIDAZOLAM 1 MG: 1 INJECTION INTRAMUSCULAR; INTRAVENOUS at 07:09

## 2025-04-22 RX ADMIN — KETOROLAC TROMETHAMINE 15 MG: 15 INJECTION, SOLUTION INTRAMUSCULAR; INTRAVENOUS at 18:27

## 2025-04-22 RX ADMIN — Medication 2 G: at 07:09

## 2025-04-22 RX ADMIN — BUPIVACAINE HYDROCHLORIDE 2.6 ML: 5 INJECTION, SOLUTION EPIDURAL; INTRACAUDAL; PERINEURAL at 07:23

## 2025-04-22 RX ADMIN — CEFAZOLIN SODIUM 2 G: 2 SOLUTION INTRAVENOUS at 22:56

## 2025-04-22 RX ADMIN — Medication 10 MG: at 07:52

## 2025-04-22 RX ADMIN — SODIUM CHLORIDE, SODIUM LACTATE, POTASSIUM CHLORIDE, AND CALCIUM CHLORIDE: .6; .31; .03; .02 INJECTION, SOLUTION INTRAVENOUS at 06:23

## 2025-04-22 RX ADMIN — ACETAMINOPHEN 975 MG: 325 TABLET, FILM COATED ORAL at 12:16

## 2025-04-22 RX ADMIN — LIDOCAINE HYDROCHLORIDE 50 MG: 10 INJECTION, SOLUTION EPIDURAL; INFILTRATION; INTRACAUDAL; PERINEURAL at 07:23

## 2025-04-22 RX ADMIN — SENNOSIDES AND DOCUSATE SODIUM 1 TABLET: 50; 8.6 TABLET ORAL at 12:16

## 2025-04-22 RX ADMIN — HYDROXYZINE HYDROCHLORIDE 10 MG: 10 TABLET ORAL at 21:15

## 2025-04-22 RX ADMIN — ASPIRIN 81 MG: 81 TABLET, COATED ORAL at 12:16

## 2025-04-22 RX ADMIN — CEFAZOLIN SODIUM 2 G: 2 SOLUTION INTRAVENOUS at 15:09

## 2025-04-22 RX ADMIN — GLYCOPYRROLATE 0.2 MG: 0.2 INJECTION INTRAMUSCULAR; INTRAVENOUS at 07:45

## 2025-04-22 RX ADMIN — PROPOFOL 100 MCG/KG/MIN: 10 INJECTION, EMULSION INTRAVENOUS at 07:23

## 2025-04-22 RX ADMIN — PHENYLEPHRINE HYDROCHLORIDE 0.5 MCG/KG/MIN: 10 INJECTION INTRAVENOUS at 07:44

## 2025-04-22 RX ADMIN — Medication 10 MG: at 07:49

## 2025-04-22 RX ADMIN — Medication 5 MG: at 07:55

## 2025-04-22 RX ADMIN — TAMSULOSIN HYDROCHLORIDE 0.8 MG: 0.4 CAPSULE ORAL at 20:56

## 2025-04-22 RX ADMIN — ACETAMINOPHEN 975 MG: 325 TABLET, FILM COATED ORAL at 20:57

## 2025-04-22 RX ADMIN — GABAPENTIN 600 MG: 300 CAPSULE ORAL at 20:57

## 2025-04-22 RX ADMIN — DEXMEDETOMIDINE HYDROCHLORIDE 8 MCG: 100 INJECTION, SOLUTION INTRAVENOUS at 09:00

## 2025-04-22 RX ADMIN — FENTANYL CITRATE 50 MCG: 0.05 INJECTION, SOLUTION INTRAMUSCULAR; INTRAVENOUS at 07:20

## 2025-04-22 RX ADMIN — TRANEXAMIC ACID 1950 MG: 650 TABLET ORAL at 05:58

## 2025-04-22 RX ADMIN — HYDROMORPHONE HYDROCHLORIDE 2 MG: 2 TABLET ORAL at 19:09

## 2025-04-22 RX ADMIN — ASPIRIN 81 MG: 81 TABLET, COATED ORAL at 20:57

## 2025-04-22 RX ADMIN — ALBUMIN (HUMAN): 12.5 SOLUTION INTRAVENOUS at 07:59

## 2025-04-22 RX ADMIN — ROSUVASTATIN CALCIUM 40 MG: 10 TABLET, FILM COATED ORAL at 20:56

## 2025-04-22 RX ADMIN — SENNOSIDES AND DOCUSATE SODIUM 1 TABLET: 50; 8.6 TABLET ORAL at 20:57

## 2025-04-22 RX ADMIN — SODIUM CHLORIDE, SODIUM LACTATE, POTASSIUM CHLORIDE, AND CALCIUM CHLORIDE: .6; .31; .03; .02 INJECTION, SOLUTION INTRAVENOUS at 12:15

## 2025-04-22 RX ADMIN — ONDANSETRON 4 MG: 2 INJECTION INTRAMUSCULAR; INTRAVENOUS at 07:44

## 2025-04-22 RX ADMIN — KETOROLAC TROMETHAMINE 15 MG: 15 INJECTION, SOLUTION INTRAMUSCULAR; INTRAVENOUS at 12:15

## 2025-04-22 RX ADMIN — FENTANYL CITRATE 50 MCG: 0.05 INJECTION, SOLUTION INTRAMUSCULAR; INTRAVENOUS at 07:16

## 2025-04-22 RX ADMIN — SODIUM CHLORIDE, SODIUM LACTATE, POTASSIUM CHLORIDE, AND CALCIUM CHLORIDE: .6; .31; .03; .02 INJECTION, SOLUTION INTRAVENOUS at 07:51

## 2025-04-22 ASSESSMENT — ENCOUNTER SYMPTOMS
SEIZURES: 0
ORTHOPNEA: 0

## 2025-04-22 ASSESSMENT — ACTIVITIES OF DAILY LIVING (ADL)
ADLS_ACUITY_SCORE: 27
ADLS_ACUITY_SCORE: 27
ADLS_ACUITY_SCORE: 25
ADLS_ACUITY_SCORE: 22
ADLS_ACUITY_SCORE: 27
ADLS_ACUITY_SCORE: 22
ADLS_ACUITY_SCORE: 22
ADLS_ACUITY_SCORE: 27
ADLS_ACUITY_SCORE: 22
ADLS_ACUITY_SCORE: 18
ADLS_ACUITY_SCORE: 18
ADLS_ACUITY_SCORE: 41
ADLS_ACUITY_SCORE: 22
ADLS_ACUITY_SCORE: 27
ADLS_ACUITY_SCORE: 18
ADLS_ACUITY_SCORE: 27
ADLS_ACUITY_SCORE: 22
ADLS_ACUITY_SCORE: 27

## 2025-04-22 NOTE — ANESTHESIA PREPROCEDURE EVALUATION
"Anesthesia Pre-Procedure Evaluation    Patient: Devang English   MRN: [id number] : 1955        Procedure : Procedure(s):  RIGHT POSTERIOR TOTAL HIP ARTHROPLASTY          Past Medical History:   Diagnosis Date    Arthritis     Hypertension       History reviewed. No pertinent surgical history.   No Known Allergies   Social History     Tobacco Use    Smoking status: Never    Smokeless tobacco: Never   Substance Use Topics    Alcohol use: Yes     Comment: 7-10 per week      Wt Readings from Last 1 Encounters:   25 83.9 kg (185 lb)        Anesthesia Evaluation   Pt has had prior anesthetic.     No history of anesthetic complications       ROS/MED HX  ENT/Pulmonary:  - neg pulmonary ROS  (-) asthma and sleep apnea   Neurologic:  - neg neurologic ROS  (-) no seizures and no CVA   Cardiovascular:     (+) Dyslipidemia hypertension- -   -  - -                                   (-) taking anticoagulants/antiplatelets, CRAMER, orthopnea/PND and murmur   METS/Exercise Tolerance: >4 METS    Hematologic:  - neg hematologic  ROS     Musculoskeletal:   (+)  arthritis,             GI/Hepatic:  - neg GI/hepatic ROS     Renal/Genitourinary:     (+)        BPH,      Endo:    (-) Type II DM and obesity   Psychiatric/Substance Use:     (+) psychiatric history anxiety       Infectious Disease:  - neg infectious disease ROS     Malignancy:  - neg malignancy ROS     Other:            Physical Exam    Airway        Mallampati: I   TM distance: > 3 FB   Neck ROM: full   Mouth opening: > 3 cm    Respiratory Devices and Support         Dental       (+) Minor Abnormalities - some fillings, tiny chips      Cardiovascular          Rhythm and rate: regular and normal (-) no murmur    Pulmonary           breath sounds clear to auscultation           OUTSIDE LABS:  CBC: No results found for: \"WBC\", \"HGB\", \"HCT\", \"PLT\"  BMP: No results found for: \"NA\", \"POTASSIUM\", \"CHLORIDE\", \"CO2\", \"BUN\", \"CR\", \"GLC\"  COAGS: No results found for: " "\"PTT\", \"INR\", \"FIBR\"  POC: No results found for: \"BGM\", \"HCG\", \"HCGS\"  HEPATIC: No results found for: \"ALBUMIN\", \"PROTTOTAL\", \"ALT\", \"AST\", \"GGT\", \"ALKPHOS\", \"BILITOTAL\", \"BILIDIRECT\", \"EFE\"  OTHER: No results found for: \"PH\", \"LACT\", \"A1C\", \"DIMITRIS\", \"PHOS\", \"MAG\", \"LIPASE\", \"AMYLASE\", \"TSH\", \"T4\", \"T3\", \"CRP\", \"SED\"    Anesthesia Plan    ASA Status:  2    NPO Status:  NPO Appropriate    Anesthesia Type: Spinal.      Maintenance: TIVA.        Consents    Anesthesia Plan(s) and associated risks, benefits, and realistic alternatives discussed. Questions answered and patient/representative(s) expressed understanding.     - Discussed: Risks, Benefits and Alternatives for the PROCEDURE were discussed     - Discussed with:  Patient      - Extended Intubation/Ventilatory Support Discussed: No.      - Patient is DNR/DNI Status: No     Use of blood products discussed: Yes.     - Discussed with: Patient.     - Consented: consented to blood products     Postoperative Care    Pain management: IV analgesics, Oral pain medications, Multi-modal analgesia.   PONV prophylaxis: Ondansetron (or other 5HT-3), Dexamethasone or Solumedrol, Background Propofol Infusion     Comments:    Other Comments: Spinal with propofol gtt, decadron/zofran, fentanyl/midaz PRN               Liseth Corey MD    Clinically Significant Risk Factors Present on Admission                   # Hypertension: Home medication list includes antihypertensive(s)           # Overweight: Estimated body mass index is 26.54 kg/m  as calculated from the following:    Height as of this encounter: 1.778 m (5' 10\").    Weight as of this encounter: 83.9 kg (185 lb).                "

## 2025-04-22 NOTE — INTERVAL H&P NOTE
"I have reviewed the surgical (or preoperative) H&P that is linked to this encounter, and examined the patient. There are no significant changes    Clinical Conditions Present on Arrival:  Clinically Significant Risk Factors Present on Admission                       # Overweight: Estimated body mass index is 26.54 kg/m  as calculated from the following:    Height as of this encounter: 1.778 m (5' 10\").    Weight as of this encounter: 83.9 kg (185 lb).       "

## 2025-04-22 NOTE — PLAN OF CARE
Problem: Hip Arthroplasty  Goal: Optimal Functional Ability  Outcome: Progressing  Goal: Optimal Pain Control and Function  Outcome: Progressing   Goal Outcome Evaluation:    Patient A&Ox4, denies having pain when asked. Numbness has gradually subsided to RLE. BLE muscle strength 4/5. Pt able to lift leg. CMS intact. Pulses present to BLE. Incentive spriometer used. Bed alarm on, call light within reach.

## 2025-04-22 NOTE — ANESTHESIA PROCEDURE NOTES
"Intrathecal injection Procedure Note    Pre-Procedure   Staff -        Anesthesiologist:  Liseth Corey MD       Performed By: anesthesiologist       Location: OR       Procedure Start/Stop Times: 4/22/2025 7:23 AM and 4/22/2025 7:24 AM       Pre-Anesthestic Checklist: patient identified, IV checked, risks and benefits discussed, informed consent, monitors and equipment checked, pre-op evaluation, at physician/surgeon's request and post-op pain management  Timeout:       Correct Patient: Yes        Correct Procedure: Yes        Correct Site: Yes        Correct Position: Yes   Procedure Documentation  Procedure: intrathecal injection         Patient Position: sitting       Skin prep: Chloraprep       Insertion Site: L3-4. (midline approach).       Needle Gauge: 24.        Needle Length (Inches): 4        Spinal Needle Type: Pencan       Introducer used       Introducer: 20 G       # of attempts: 1 and  # of redirects:  0    Assessment/Narrative         Paresthesias: No.       CSF fluid: clear.       Opening pressure was cmH2O while  Sitting.      Medication(s) Administered   0.5% Bupivacaine PF (Intrathecal) - Intrathecal   2.6 mL - 4/22/2025 7:23:00 AM  Medication Administration Time: 4/22/2025 7:23 AM     Comments:  Patient tolerated well, no complications noted. Clear, free-flowing CSF obtained. NO heme and NO paresthesias during procedure. +gentle barbotage at beginning, middle and end of injection. Easy injection. Setting up well.         FOR [facility] ([facility] [place] ONLY:   Pain Team Contact information: please page the Pain Team Via [employer]My[employer]. Search \"Pain\". During daytime hours, please page the attending first. At night please page the resident first.      "

## 2025-04-22 NOTE — ANESTHESIA CARE TRANSFER NOTE
Patient: Devang English    Procedure: Procedure(s):  RIGHT POSTERIOR TOTAL HIP ARTHROPLASTY       Diagnosis: Primary osteoarthritis of right hip [M16.11]  Diagnosis Additional Information: No value filed.    Anesthesia Type:   Spinal     Note:    Oropharynx: oropharynx clear of all foreign objects and spontaneously breathing  Level of Consciousness: awake  Oxygen Supplementation: face mask  Level of Supplemental Oxygen (L/min / FiO2): 6  Independent Airway: airway patency satisfactory and stable  Dentition: dentition unchanged  Vital Signs Stable: post-procedure vital signs reviewed and stable  Report to RN Given: handoff report given  Patient transferred to: PACU    Handoff Report: Identifed the Patient, Identified the Reponsible Provider, Reviewed the pertinent medical history, Discussed the surgical course, Reviewed Intra-OP anesthesia mangement and issues during anesthesia, Set expectations for post-procedure period and Allowed opportunity for questions and acknowledgement of understanding  Vitals:  Vitals Value Taken Time   /69 04/22/25 0923   Temp 36.6  C (97.88  F) 04/22/25 0925   Pulse 74 04/22/25 0925   Resp 13 04/22/25 0925   SpO2 96 % 04/22/25 0925   Vitals shown include unfiled device data.    Electronically Signed By: MARIE Jordan CRNA  April 22, 2025  9:27 AM

## 2025-04-22 NOTE — PHARMACY-ADMISSION MEDICATION HISTORY
Pharmacist ANANYA Medication History    Admission medication history is complete. The information provided in this note is only as accurate as the sources available at the time of the update.    Medication reconciliation/reorder completed by provider prior to medication history? No    Information Source(s): Patient and Clinic records and Care Everywhere via in-person    Pertinent Information: N/A    Allergies reviewed with patient and updates made in EHR: yes    Medications available for use during hospital stay: None.      Medication History Completed By: Vishal Maki [facility] 4/22/2025 6:37 AM    PTA Med List   Medication Sig Last Dose/Taking    amLODIPine (NORVASC) 5 MG tablet Take 5 mg by mouth daily. 4/22/2025 Morning    gabapentin (NEURONTIN) 300 MG capsule Take 600 mg by mouth 2 times daily. 4/22/2025 Morning    ibuprofen (ADVIL/MOTRIN) 200 MG tablet Take 200 mg by mouth every 4 hours as needed for pain. Past Week    Levomefolate Glucosamine (METHYLFOLATE) 400 MCG CAPS Take 1 capsule by mouth daily. 4/22/2025 Morning    pramipexole (MIRAPEX) 0.25 MG tablet Take 0.25-0.5 mg by mouth nightly as needed (restless legs). Unknown    rosuvastatin (CRESTOR) 40 MG tablet Take 40 mg by mouth at bedtime. 4/21/2025 Bedtime    sertraline (ZOLOFT) 100 MG tablet Take 100 mg by mouth daily. 4/22/2025 Morning    tamsulosin (FLOMAX) 0.4 MG capsule Take 0.8 mg by mouth every evening. 4/21/2025 Bedtime    Vitamin D3 (CHOLECALCIFEROL) 25 mcg (1000 units) tablet Take 1 tablet by mouth daily. 4/22/2025 Morning
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
## 2025-04-22 NOTE — CONSULTS
[facility] ACUTE INPATIENT PAIN SERVICE    [facility], [facility], [facility], [facility], [place]   PAIN CONSULT          ASSESSMENT/ PLAN:   Pain is consistent with acute post-operative pain. POD#0 right total hip arthoplasty. Presence of implanted [employer] Spinal Cord Stimulator implant (11/2022)      Multimodal Medication Therapy:   Adjuvants:   - APAP 975mg q8h  - Gabapentin 600mg bid  - Ketorolac 15mg q6h prn  - Topical Lidocaine  - Methocarbamol 500mg q6h prn rotate to flexeril 5mg tid prn  Opioids:   - Stop Oxycodone 2.5-5mg q4h prn. Start Dilaudid 2-4mg q4h prn - first line  - IV hydromorphone 0.2mg q4h prn - second line  Non-medication interventions- Ice, PT  Constipation Prophylaxis- Scheduled miralax and senna, prn bisacodyl and MOM  Follow up /Discharge Recommendations - We recommend prescribing the following at the time of discharge:  Joint Replacements (Hip)- 200MME - 40 tabs of norco, 26 tabs of oxycodone, or 25 tabs of dilaudid       Subjective:  Devang is seen today in his bed in the PACU alert and oriented in no acute distress. Reports his pain is currently a 6/10 located in his right hip. He does have chronic back pain which is tolerable.    States oxycodone did not provide much relief in the past and would like to rotate to dilaudid. Also requests to rotate to flexeril as that was effective for him in the past.     Denies nausea, vomiting, constipation. Reports he did have constipation with opioids in the past. Will continue to monitor bowel regimen with scheduled Senna and Miralax.     Reviewed continuing with current plan with medication adjustments mentioned above, all questions answered.     HPI:  Devang English is a 69 year old male who was admitted on 4/22/2025.  I was asked by Frantz Brenner CNP to see the patient for management of his acute post-operative pain. History of HTN, hyperlipidemia, chronic low back pain with spinal cord stimulator, insomnia, allergies, anxiety,  "hypogonadism, restless legs, BPH, elevated TSH.        PDMP RESULTS: Has received intermittent small Rx's for oxycodone 5mg. Last fill was #26 tabs (10/22/2024). Is also managed on Gabapentin      History   Drug Use Unknown         Tobacco Use      Smoking status: Never      Smokeless tobacco: Never        Objective:  Vital signs in last 24 hours:  B/P: 109/68, T: 97.5, P: 72, R: 18   Blood pressure 109/68, pulse 72, temperature 97.5  F (36.4  C), resp. rate 18, height 1.778 m (5' 10\"), weight 83.9 kg (185 lb), SpO2 95%.        Review of Systems:   As per subjective, all others negative.    Physical Exam    General: in no apparent distress and non-toxic   HEENT: Head normocephalic atraumatic, oral mucosa moist. Sclerae anicteric  CV: Regular rhythm, normal rate, no murmurs  Resp: No wheezes, no rales or rhonchi, no focal consolidations  GI: Soft; non-tender; +Bs  Skin: Dressing CDI  Extremities: No peripheral edema  Psych: Normal affect, mood euthymic  Neuro: Grossly normal          Imaging:  Personally Reviewed.    No results found for this visit on 04/22/25.     Lab Results:  Personally Reviewed.   Last Comprehensive Metabolic Panel:  No results found for: \"NA\", \"POTASSIUM\", \"CHLORIDE\", \"CO2\", \"ANIONGAP\", \"GLC\", \"BUN\", \"CR\", \"GFRESTIMATED\", \"DIMITRIS\"     UA: No results found for: \"UAMP\", \"UBARB\", \"BENZODIAZEUR\", \"UCANN\", \"UCOC\", \"OPIT\", \"UPCP\"           Please see A&P for additional details of medical decision making.      Derrick Gillette PA-C  Acute Care Pain Management  Team  Hours of pain coverage Mon-Fri 8-1600, afterhours please call the primary team   [facility] ([place], [place], [place], [place])   Page via [employer] web console -Click for [employer]            "

## 2025-04-22 NOTE — OP NOTE
Operative Report    PATIENT Devang English   DATE OF SURGERY:  4/22/2025    PREOPERATIVE DIAGNOSIS   History of surgically stabilized right hip fracture  Posttraumatic osteoarthritis, right hip    POSTOPERATIVE DIAGNOSIS   History of surgically stabilized right hip fracture  Posttraumatic osteoarthritis, right hip    PROCEDURE PERFORMED   Right total hip arthroplasty, posterior approach    IMPLANTS  Implant Name Type Inv. Item Serial No.  Lot No. LRB No. Used Action   TRIDENT II TRITANIUM CLUSTERHOLE 52E - [id number] Total Joint Component/Insert TRIDENT II TRITANIUM CLUSTERHOLE 52E  [employer] [id number] Right 1 Implanted   IMP LINER HOWM ACETABULUM 36MM 50-52MM 621-00-36E - [id number] Total Joint Component/Insert IMP LINER HOWM ACETABULUM 36MM 50-52MM 621-00-36E  [employer] [id number] Right 1 Implanted   IMPLANT HIP STM 107MM NK 38.5MM INSG 6 HI STM HI 9512-5628 - [id number] Total Joint Component/Insert IMPLANT HIP STM 107MM NK 38.5MM INSG 6 HI [facility] [place] [address]  [employer] [id number] Right 1 Implanted   IMP HEAD FEMORAL STRK BIOLOX DELTA CERAMIC 36MM +0MM - [id number] Total Joint Component/Insert IMP HEAD FEMORAL STRK BIOLOX DELTA CERAMIC 36MM +0MM  [employer] [id number] Right 1 Implanted     SURGEON  Misha Jones MD     ASSISTANT   Rachel Kirk PA-C; assistant was required for patient positioning, surgical assistance, wound closure and monitoring patient's safety throughout the case.    ANESTHESIA  Choice      FINDINGS:  1.  Distorted proximal femoral anatomy related to prior hip fracture with proximal femoral retroversion.  2.  Full-thickness cartilage loss, right hip    SPECIMENS:  none    ESTIMATED BLOOD LOSS:  200cc    COMPLICATIONS   None.      INDICATION FOR PROCEDURE  Devang English is a 69 year old male with a history of a right hip fracture which was surgically stabilized many years ago.  Subsequently, he underwent hardware removal.  He is unfortunately gone on  to develop osteoarthritis within the right hip.  He has tried and failed a number of conservative measures, including over-the-counter pain medications, activity modifications, at home exercises.  In spite of these, he has progressed to the point that he is limited in his ability to perform activities of daily living or walk even short distances without severe pain.  To that end, he would like to move forward with right total hip arthroplasty.  Posterior approach is utilized in this case given his history of hip surgery.    PREOPERATIVE EXAMINATION:   Well-healed anterior lateral prior hip incisions.    INFORMED CONSENT  Devang English was identified in the preoperative holding area and was identified using medical record number, name, and date of birth, all of which were confirmed. The operative extremity was marked using an indelible marker. Once again, all risks and benefits as well as alternatives to surgical intervention were discussed with the patient in detail and all their questions were answered. Risks discussed included but were not limited to: instability, leg length discrepancy, infection, wound healing issues, persistent pain, bleeding, scarring, stiffness, thromboembolic events, fracture, malalignment/malrotation, implant complications, severe limb dysfunction, loss of limb, and loss of life. The patient signed informed consent and wished to proceed with surgery as scheduled.     DESCRIPTION OF PROCEDURE   Devang English was brought back to the operating room.  Spinal anesthesia was achieved without difficulty.  The patient was then transferred to the standard table in the lateral decubitus position, axillary roll placed, bony prominences well padded.     A timeout was performed prior to the procedure.  Three separate staff members confirmed the patient's name, correct site and side of surgery and procedure being performed.  Antibiotics and TXA were confirmed to be given prior to incision.      Standard posterior approach utilized.  His prior incisions were not usable given their anterior and anterior lateral nature, as well as their trajectory.  As such, a new incision was made longitudinally, centered over the greater trochanter.  IT band divided, Charnley retractor inserted. Posterior capsular structures taken down in one continuous sleeve and tagged for later repair. Hip was then flexed, adducted, and internally rotated to dislocate. Soft tissue cleared from neck and piriformis fossa. Neck cut made and head removed. Attention was then turned to the acetabulum.     Acetabulum exposed and soft tissue cleared, including labrum. I then began sequentially remeaming and reamed up to good bleeding bone circumferentially. I reamed up to 52mm. A 52mm acetabular component was then inserted in the appropriate amount of inclination and anteversion. Trial liner placed. Attention then turned back to the femur.      Proximal femur exposed.  It appeared that there was femoral retroversion.  I did have some difficulty at this stage finding the appropriate canal, and in fact perforated the femur posteriorly using the canal finder.  Perforation was maybe 1 cm at most in diameter and was easily bypassed with a standard stem.  I then began sequentially broaching. I broached up to a size 6 broach with good fit and fill. Trial neck placed and hip reduced.    With these components in place, I had stability with flexion and internal rotation to 90 degreees. Leg lengths felt appropriate. Intra-op XR utilized in this case.  Is somewhat difficult to tell leg length on AP pelvis view given his distorted proximal femur and lesser trochanter; however, the leg lengths appeared radiographically similar.  Clinically they also felt similar.  Orthogonal views were utilized to ensure that the stem was centrally located within the femur, which was.     Hip was then dislocated. Broach removed, acetabulum exposed. Trial liner removed.  Screws not utilized. Final liner impacted and ensured to be fully seated. Attention then turned back to the femur, where the final stem was opened and impacted, no fractures appreciated. Final head placed onto a cleaned and dried trunnion, hip reduced, stability exam unchanged.      Hip irrigated. Local injection utilized. Posterior capsule repaired with heavy ethibond. IT band repaired with ethibond followed by stratafix. Superficial soft tissue closed in layers with vicryl and monocryl suture, skin glue used, mepilex dressing placed.    22 modifier will be applied to this case based on increased surgical time by 50% based on distorted proximal femoral anatomy related to prior hip surgery and difficulty obtaining access to the femoral canal related to this.     She was then taken to the recovery room in stable condition.     POSTOPERATIVE PLAN:  -Pain control  -PT/OT, WBAT, posterior hip precautions  -24 hours IV antibiotics  -ASA for DVT ppx  -XR in PACU    Misha Jones MD   [facility]

## 2025-04-22 NOTE — ANESTHESIA POSTPROCEDURE EVALUATION
Patient: Devang English    Procedure: Procedure(s):  RIGHT POSTERIOR TOTAL HIP ARTHROPLASTY       Anesthesia Type:  Spinal    Note:  Disposition: Outpatient   Postop Pain Control: Uneventful            Sign Out: Well controlled pain   PONV: No   Neuro/Psych: Uneventful            Sign Out: Acceptable/Baseline neuro status   Airway/Respiratory: Uneventful            Sign Out: Acceptable/Baseline resp. status   CV/Hemodynamics: Uneventful            Sign Out: Acceptable CV status; No obvious hypovolemia; No obvious fluid overload   Other NRE: NONE   DID A NON-ROUTINE EVENT OCCUR? No           Last vitals:  Vitals Value Taken Time   /75 04/22/25 1030   Temp 36.3  C (97.34  F) 04/22/25 0954   Pulse 67 04/22/25 1041   Resp 15 04/22/25 1021   SpO2 92 % 04/22/25 1041   Vitals shown include unfiled device data.    Electronically Signed By: Liseth Corey MD  April 22, 2025  10:43 AM
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
## 2025-04-23 ENCOUNTER — APPOINTMENT (OUTPATIENT)
Dept: OCCUPATIONAL THERAPY | Facility: CLINIC | Age: 70
End: 2025-04-23
Attending: ORTHOPAEDIC SURGERY
Payer: MEDICARE

## 2025-04-23 VITALS
TEMPERATURE: 98.1 F | DIASTOLIC BLOOD PRESSURE: 78 MMHG | SYSTOLIC BLOOD PRESSURE: 128 MMHG | RESPIRATION RATE: 18 BRPM | WEIGHT: 185 LBS | HEIGHT: 70 IN | HEART RATE: 68 BPM | OXYGEN SATURATION: 95 % | BODY MASS INDEX: 26.48 KG/M2

## 2025-04-23 PROBLEM — R33.8 ACUTE URINARY RETENTION: Status: ACTIVE | Noted: 2025-04-23

## 2025-04-23 LAB
ALBUMIN SERPL BCG-MCNC: 3.7 G/DL (ref 3.5–5.2)
ALBUMIN UR-MCNC: 100 MG/DL
ANION GAP SERPL CALCULATED.3IONS-SCNC: 10 MMOL/L (ref 7–15)
APPEARANCE UR: ABNORMAL
BILIRUB UR QL STRIP: NEGATIVE
BUN SERPL-MCNC: 16.1 MG/DL (ref 8–23)
CALCIUM SERPL-MCNC: 8.7 MG/DL (ref 8.8–10.4)
CHLORIDE SERPL-SCNC: 103 MMOL/L (ref 98–107)
COLOR UR AUTO: ABNORMAL
CREAT SERPL-MCNC: 0.72 MG/DL (ref 0.67–1.17)
EGFRCR SERPLBLD CKD-EPI 2021: >90 ML/MIN/1.73M2
GLUCOSE BLDC GLUCOMTR-MCNC: 112 MG/DL (ref 70–99)
GLUCOSE SERPL-MCNC: 120 MG/DL (ref 70–99)
GLUCOSE UR STRIP-MCNC: NEGATIVE MG/DL
HCO3 SERPL-SCNC: 24 MMOL/L (ref 22–29)
HGB BLD-MCNC: 11.6 G/DL (ref 13.3–17.7)
HGB UR QL STRIP: ABNORMAL
HOLD SPECIMEN: NORMAL
KETONES UR STRIP-MCNC: NEGATIVE MG/DL
LEUKOCYTE ESTERASE UR QL STRIP: ABNORMAL
MUCOUS THREADS #/AREA URNS LPF: PRESENT /LPF
NITRATE UR QL: NEGATIVE
PH UR STRIP: 6.5 [PH] (ref 5–7)
PHOSPHATE SERPL-MCNC: 3.1 MG/DL (ref 2.5–4.5)
POTASSIUM SERPL-SCNC: 4.1 MMOL/L (ref 3.4–5.3)
RBC URINE: >182 /HPF
SODIUM SERPL-SCNC: 137 MMOL/L (ref 135–145)
SP GR UR STRIP: 1.03 (ref 1–1.03)
SQUAMOUS EPITHELIAL: 1 /HPF
UROBILINOGEN UR STRIP-MCNC: NORMAL MG/DL
WBC URINE: 18 /HPF

## 2025-04-23 PROCEDURE — 250N000009 HC RX 250: Performed by: INTERNAL MEDICINE

## 2025-04-23 PROCEDURE — 99214 OFFICE O/P EST MOD 30 MIN: CPT | Performed by: PAIN MEDICINE

## 2025-04-23 PROCEDURE — 250N000011 HC RX IP 250 OP 636: Mod: JZ | Performed by: ORTHOPAEDIC SURGERY

## 2025-04-23 PROCEDURE — 99214 OFFICE O/P EST MOD 30 MIN: CPT | Performed by: FAMILY MEDICINE

## 2025-04-23 PROCEDURE — 97535 SELF CARE MNGMENT TRAINING: CPT | Mod: GO

## 2025-04-23 PROCEDURE — 36415 COLL VENOUS BLD VENIPUNCTURE: CPT | Performed by: ORTHOPAEDIC SURGERY

## 2025-04-23 PROCEDURE — 85018 HEMOGLOBIN: CPT | Performed by: ORTHOPAEDIC SURGERY

## 2025-04-23 PROCEDURE — 97166 OT EVAL MOD COMPLEX 45 MIN: CPT | Mod: GO

## 2025-04-23 PROCEDURE — 81001 URINALYSIS AUTO W/SCOPE: CPT | Performed by: PAIN MEDICINE

## 2025-04-23 PROCEDURE — 82310 ASSAY OF CALCIUM: CPT | Performed by: PAIN MEDICINE

## 2025-04-23 PROCEDURE — 250N000013 HC RX MED GY IP 250 OP 250 PS 637: Performed by: FAMILY MEDICINE

## 2025-04-23 PROCEDURE — 87086 URINE CULTURE/COLONY COUNT: CPT | Performed by: PAIN MEDICINE

## 2025-04-23 PROCEDURE — 250N000013 HC RX MED GY IP 250 OP 250 PS 637: Performed by: ORTHOPAEDIC SURGERY

## 2025-04-23 PROCEDURE — 250N000013 HC RX MED GY IP 250 OP 250 PS 637: Performed by: PHYSICIAN ASSISTANT

## 2025-04-23 PROCEDURE — 82962 GLUCOSE BLOOD TEST: CPT

## 2025-04-23 RX ORDER — ACETAMINOPHEN 325 MG/1
975 TABLET ORAL EVERY 8 HOURS
Qty: 100 TABLET | Refills: 0 | Status: SHIPPED | OUTPATIENT
Start: 2025-04-23 | End: 2025-04-29

## 2025-04-23 RX ORDER — PREGABALIN 50 MG/1
50 CAPSULE ORAL 2 TIMES DAILY
Status: DISCONTINUED | OUTPATIENT
Start: 2025-04-23 | End: 2025-04-23 | Stop reason: HOSPADM

## 2025-04-23 RX ORDER — PREGABALIN 50 MG/1
50 CAPSULE ORAL 2 TIMES DAILY
Qty: 60 CAPSULE | Refills: 0 | Status: SHIPPED | OUTPATIENT
Start: 2025-04-23

## 2025-04-23 RX ORDER — AMOXICILLIN 250 MG
1 CAPSULE ORAL 2 TIMES DAILY
Qty: 60 TABLET | Refills: 0 | Status: SHIPPED | OUTPATIENT
Start: 2025-04-23

## 2025-04-23 RX ORDER — LIDOCAINE HYDROCHLORIDE 20 MG/ML
JELLY TOPICAL ONCE
Status: COMPLETED | OUTPATIENT
Start: 2025-04-23 | End: 2025-04-23

## 2025-04-23 RX ORDER — HYDROMORPHONE HYDROCHLORIDE 2 MG/1
2-4 TABLET ORAL EVERY 4 HOURS PRN
Qty: 24 TABLET | Refills: 0 | Status: SHIPPED | OUTPATIENT
Start: 2025-04-23 | End: 2025-04-29 | Stop reason: DRUGHIGH

## 2025-04-23 RX ORDER — ASPIRIN 81 MG/1
81 TABLET, COATED ORAL 2 TIMES DAILY
Qty: 60 TABLET | Refills: 0 | Status: SHIPPED | OUTPATIENT
Start: 2025-04-23

## 2025-04-23 RX ADMIN — SENNOSIDES AND DOCUSATE SODIUM 1 TABLET: 50; 8.6 TABLET ORAL at 08:43

## 2025-04-23 RX ADMIN — GABAPENTIN 600 MG: 300 CAPSULE ORAL at 08:43

## 2025-04-23 RX ADMIN — ACETAMINOPHEN 975 MG: 325 TABLET, FILM COATED ORAL at 04:07

## 2025-04-23 RX ADMIN — SERTRALINE 100 MG: 100 TABLET, FILM COATED ORAL at 08:43

## 2025-04-23 RX ADMIN — AMLODIPINE BESYLATE 5 MG: 5 TABLET ORAL at 08:43

## 2025-04-23 RX ADMIN — POLYETHYLENE GLYCOL 3350 17 G: 17 POWDER, FOR SOLUTION ORAL at 08:43

## 2025-04-23 RX ADMIN — LIDOCAINE HYDROCHLORIDE: 20 JELLY TOPICAL at 05:15

## 2025-04-23 RX ADMIN — ASPIRIN 81 MG: 81 TABLET, COATED ORAL at 08:43

## 2025-04-23 RX ADMIN — KETOROLAC TROMETHAMINE 15 MG: 15 INJECTION, SOLUTION INTRAMUSCULAR; INTRAVENOUS at 07:40

## 2025-04-23 RX ADMIN — KETOROLAC TROMETHAMINE 15 MG: 15 INJECTION, SOLUTION INTRAMUSCULAR; INTRAVENOUS at 01:19

## 2025-04-23 RX ADMIN — ACETAMINOPHEN 975 MG: 325 TABLET, FILM COATED ORAL at 11:52

## 2025-04-23 RX ADMIN — HYDROMORPHONE HYDROCHLORIDE 4 MG: 2 TABLET ORAL at 04:07

## 2025-04-23 ASSESSMENT — ACTIVITIES OF DAILY LIVING (ADL)
ADLS_ACUITY_SCORE: 29
ADLS_ACUITY_SCORE: 27
ADLS_ACUITY_SCORE: 29
PREVIOUS_RESPONSIBILITIES: MEAL PREP;HOUSEKEEPING;LAUNDRY;SHOPPING;MEDICATION MANAGEMENT;FINANCES;DRIVING
ADLS_ACUITY_SCORE: 27
ADLS_ACUITY_SCORE: 27
ADLS_ACUITY_SCORE: 29
ADLS_ACUITY_SCORE: 27
ADLS_ACUITY_SCORE: 29
ADLS_ACUITY_SCORE: 27
ADLS_ACUITY_SCORE: 27
ADLS_ACUITY_SCORE: 29
ADLS_ACUITY_SCORE: 27
ADLS_ACUITY_SCORE: 27

## 2025-04-23 NOTE — PROGRESS NOTES
"[facility] ACUTE INPATIENT PAIN SERVICE    [facility], [facility], [facility], [facility], [place]   PAIN follow up        ASSESSMENT/ PLAN:  Acute pain secondary to right total hip arthroplasty, in the setting of chronic low back pain with spinal cord stim.     Multimodal Medication Therapy:    Adjuvants:  add on renal panel, stop flexeril, continue home dose of gabapentin   Opioids: Hydromorphone/Dilaudid2- 4 mg  Ok to minimize IV dilaudid   Non-medication interventions- Ice, PT   Constipation Prophylaxis-  senna   Follow up /Discharge Recommendations - We recommend prescribing the following at the time of discharge: 2000MME which is 25 tabs of dilaudid       Subjective:    Pain is well controlled. Could not void last night. Has nation in place now. Discussed fibromylagia pain. No benefit with gabapentin. Offered rotation to lyrica. Oxycodone does not work well for him. Had genetic testing in the past which indicated that oxycodone was not effective.  Provided education on use of opioids, storage, disposal, and overdose risk. Advised to avoid NSAIDs.           HPI:  Devang English is a 69 year old male who was admitted on 4/22/2025.  I was asked by Frantz Brenner CNP to see the patient for management of his acute post-operative pain. History of HTN, hyperlipidemia, chronic low back pain with spinal cord stimulator, insomnia, allergies, anxiety, hypogonadism, restless legs, BPH, elevated TSH.        PDMP RESULTS: Has received intermittent small Rx's for oxycodone 5mg. Last fill was #26 tabs (10/22/2024). Is also managed on Gabapentin      History   Drug Use Unknown         Tobacco Use      Smoking status: Never      Smokeless tobacco: Never        Objective:  Vital signs in last 24 hours:  B/P: 121/72, T: 97.8, P: 67, R: 16   Blood pressure 121/72, pulse 67, temperature 97.8  F (36.6  C), temperature source Oral, resp. rate 16, height 1.778 m (5' 10\"), weight 83.9 kg (185 lb), SpO2 93%.        Review of Systems: "   As per subjective, all others negative.    Physical Exam    General: in no apparent distress and non-toxic    HEENT: Head normocephalic atraumatic, oral mucosa moist. Sclerae anicteric               Please see A&P for additional details of medical decision making.  MANAGEMENT DISCUSSED with the following over the past 24 hours: RN   NOTE(S)/MEDICAL RECORDS REVIEWED over the past 24 hours: medicine and ortho  Tests personally interpreted in the past 24 hours:  **CLEAR ALL SELECTIONS**  Tests ORDERED & REVIEWED in the past 24 hours:  - Renal panel added   SUPPLEMENTAL HISTORY, in addition to the patient's history, over the past 24 hours obtained from:   - RN  Medical complexity over the past 24 hours:  -------------------------- HIGH RISK FOR MORBIDITY -------------------------------------------------------------  - Parenteral (IV) CONTROLLED SUBSTANCES ordered           Bhakti Lynn APRN, CNS, CNP  Acute Care Pain Management  Team  Hours of pain coverage Mon-Fri 8-1600  After hours contact the primary team  [facility] (KENYON, ERVINs, SD, RH)   Page via Drive web console -Click for INMAN

## 2025-04-23 NOTE — PLAN OF CARE
Patient vital signs are at baseline: Yes  Patient able to ambulate as they were prior to admission or with assist devices provided by therapies during their stay:  Yes  Patient MUST void prior to discharge:  No,  Reason:  Patient being discharged with nation catheter. Will see [place] [facility] outpatient in 2 weeks for voiding trial.  Patient able to tolerate oral intake:  Yes  Pain has adequate pain control using Oral analgesics:  Yes  Does patient have an identified :  Yes  Has goal D/C date and time been discussed with patient:  Yes    Provided discharge teaching to patient and wife. All questions answered. Patient discharged to home via private vehicle.    Carlotta Pardo RN on 4/23/2025 at 4:31 PM

## 2025-04-23 NOTE — PLAN OF CARE
Problem: Adult Inpatient Plan of Care  Goal: Absence of Hospital-Acquired Illness or Injury  Intervention: Identify and Manage Fall Risk  Problem: Adult Inpatient Plan of Care  Goal: Optimal Comfort and Wellbeing  Intervention: Monitor Pain and Promote Comfort   Problem: Hip Arthroplasty  Goal: Optimal Coping  Intervention: Support Psychosocial Response to Surgery and Mobility Changes   Goal Outcome Evaluation:  Patient vital signs are at baseline: Yes  Patient able to ambulate as they were prior to admission or with assist devices provided by therapies during their stay:  Yes  Patient MUST void prior to discharge:  No. Patient was unable to void this shift with last bladder scan being 953 ml and C/O feeling pressure. MD updated and he ordered to place an indwelling Calhoun catheter. Calhoun placed by NICOLE RN.   Patient able to tolerate oral intake:  Yes  Pain has adequate pain control using Oral analgesics:  No,  Reason:  Utilized scheduled IV Toradol and PRN Oxycodone and scheduled Tylenol.   Does patient have an identified :  Yes  Has goal D/C date and time been discussed with patient:  Yes

## 2025-04-23 NOTE — SIGNIFICANT EVENT
Significant Event Note    Time of event: 2:25 PM April 23, 2025    Description of event:  BMP result normal.  Creatinine 0.72.  Urinalysis mild to moderately abnormal.  Does show blood and just low leukocytes.  Results copied below.  Sent for urine culture.    Plan:  -Okay to discharge with Calhoun catheter.  -No specific antibiotics at this time.  Patient will be discharging on Duricef per orthopedic protocols.  -Will be monitoring urine culture and contact patient if it turns positive.  -As previously discussed we will follow-up with [place] [facility] or his preferred outpatient urologist in 1 to 2 weeks for voiding trial.  Calhoun catheter to remain in place until that time.  -Discharge med rec reviewed and our area of responsibility was addressed.    Discussed with: nursing staff   Latest Reference Range & Units 04/23/25 09:59   Color Urine Colorless, Straw, Light Yellow, Yellow  Red !   Appearance Urine Clear  Cloudy !   Glucose Urine Negative mg/dL Negative   Bilirubin Urine Negative  Negative   Ketones Urine Negative mg/dL Negative   Specific Gravity Urine 1.001 - 1.030  1.029   pH Urine 5.0 - 7.0  6.5   Protein Albumin Urine Negative mg/dL 100 !   Urobilinogen mg/dL Normal mg/dL Normal   Nitrite Urine Negative  Negative   Blood Urine Negative  >1.0 mg/dL !   Leukocyte Esterase Urine Negative  75 Joseph/uL !   WBC Urine <=5 /HPF 18 (H)   RBC Urine <=2 /HPF >182 (H)   Squamous Epithelial /HPF Urine <=1 /HPF 1   Mucus Urine None Seen /LPF Present !   !: Data is abnormal  (H): Data is abnormally high      Vincenzo Zamora MD

## 2025-04-23 NOTE — PROGRESS NOTES
[facility] MEDICINE  PROGRESS NOTE     Code Status: Full Code  Procedure(s):  RIGHT POSTERIOR TOTAL HIP ARTHROPLASTY  1 Day Post-Op  Assessment and Plan:  Devang English is a 69 year old male with a PMH of HTN, hyperlipidemia, chronic low back pain with spinal cord stimulator, insomnia, allergies, anxiety, hypogonadism, restless legs, BPH, elevated TSH.  Underwent right total hip arthroplasty.  Postoperatively had struggles with urinary retention.  Ultimately had a difficult Calhoun catheter placement and subsequent urine output is quite dark.  Recommend keeping Calhoun catheter in at time of discharge.  Will check a urinalysis and basic metabolic panel.  If results just show blood and normal renal function then should be cleared for discharge.  Should follow-up with [place] [facility] for voiding trial in 1 to 2 weeks.        Status post right total hip arthroplasty  Postoperative management per surgery.    BPH  Acute urinary retention  -Continue home Flomax nightly.    -Significant retention overnight ultimately resulting in difficult Calhoun catheter placement.  -Patient notes he has had bladder issues in the past but has never required Calhoun catheter after his knee surgeries.  -Check a BMP and urinalysis.  Anticipate will be seeing blood and hopefully normal kidney function.  If that is the case would be cleared for discharge.  Should keep Calhoun catheter in at discharge and follow-up with [place] [facility] in 1 to 2 weeks.    Acute blood loss anemia  Preoperative hemoglobin 13.8.  Postoperative hemoglobin 11.6.  No indication for transfusion at this time.  Follow per protocols.     Essential hypertension  Continue home Norvasc with hold parameters.     Restless leg syndrome  Continue home bedtime Mirapex and scheduled gabapentin.  Can resume his levomefolate glucosamine at discharge.     Hyperlipidemia  Continue home Crestor.     Depression  Hypogonadism  Continue home Zoloft.    "  Chronic low back pain  Status post spinal cord stimulator  Noted.     Anticoagulation.  Aspirin 81 mg twice daily ordered by surgery.  Routine postoperative risk.     Fluids: Per surgery   Pain meds: Per surgery  Therapy: Per surgery  Calhoun:PRESENT, indication: Acute retention or obstruction  Lines: None       Current Diet  Orders Placed This Encounter      Advance Diet as Tolerated: Regular Diet Adult      Discharge Instruction - Regular Diet Adult    Supplements  None    Lines/Drains/Airways       PIV Line  Duration             Peripheral IV 04/22/25 Left Hand 1 day              Drain  Duration             Urinary Drain 04/23/25 Urethral Catheter Coude;Latex 14 fr <1 day              Wound  Duration             Incision/Surgical Site 04/22/25 Right Hip 1 day                  Barriers to Discharge: Urinalysis, BMP    Disposition: Anticipate discharge later today after we can review these results and coordinate discharge follow-up with [place] [facility] in 1 to 2 weeks.  Calhoun catheter anticipated to be left in at discharge.  Medically Ready for Discharge: Anticipated Today       Clinically Significant Risk Factors Present on Admission                   # Hypertension: Noted on problem list           # Overweight: Estimated body mass index is 26.54 kg/m  as calculated from the following:    Height as of this encounter: 1.778 m (5' 10\").    Weight as of this encounter: 83.9 kg (185 lb).              Interval History/Subjective:  No chest pain.  No shortness of breath.  No nausea or vomiting.  Had significant urinary retention last night ultimately resulting in difficult Calhoun catheter placement.  Has had significant dark urine output since that time.  Patient has been good about taking his Flomax.  With his previous surgeries denies any need for Calhoun catheters at discharge.  No fever sweats or chills.  Feels like his surgical pain control is doing quite well.  Questions answered to verbalized " satisfaction.      Last 24H PRN:     HYDROmorphone (DILAUDID) tablet 2-4 mg, 4 mg at 04/23/25 0407    Physical Exam/Objective:  Temp:  [97  F (36.1  C)-97.8  F (36.6  C)] 97.8  F (36.6  C)  Pulse:  [60-75] 67  Resp:  [12-18] 16  BP: (109-157)/(64-84) 121/72  SpO2:  [92 %-96 %] 93 %  Wt Readings from Last 4 Encounters:   04/22/25 83.9 kg (185 lb)     Body mass index is 26.54 kg/m .    Constitutional: awake, alert, cooperative, no apparent distress, and appears stated age  ENT: Normocephalic, without obvious abnormality, atraumatic, external ears without lesions, oral pharynx with moist mucous membranes, tonsils without erythema or exudates, gums normal and good dentition.  Respiratory: No increased work of breathing, good air exchange, clear to auscultation bilaterally, no crackles or wheezing  Cardiovascular: Normal apical impulse, regular rate and rhythm, normal S1 and S2, no S3 or S4, and no murmur noted  GI: No scars, normal bowel sounds, soft, non-distended, non-tender, no masses palpated, no hepatosplenomegally  Skin: normal skin color, texture, turgor, no redness, warmth, or swelling, and no rashes  Musculoskeletal: Limited exam due to postoperative status but has good function in all 4 extremities. no lower extremity pitting edema present  Neurologic: Cranial nerves II-XII are grossly intact. Sensory:  Sensory intact  Neuropsychiatric: General: normal, calm, and normal eye contact Level of consciousness: alert / normal Affect: normal Orientation: oriented to self, place, time and situation Memory and insight: normal, memory for past and recent events intact, and thought process normal      Medications:   Personally Reviewed.  Medications   Current Facility-Administered Medications   Medication Dose Route Frequency Provider Last Rate Last Admin    lactated ringers infusion   Intravenous Continuous Misha Jones MD 75 mL/hr at 04/22/25 1215 New Bag at 04/22/25 1215     Current Facility-Administered  Medications   Medication Dose Route Frequency Provider Last Rate Last Admin    acetaminophen (TYLENOL) tablet 975 mg  975 mg Oral Q8H Misha Jones MD   975 mg at 04/23/25 0407    amLODIPine (NORVASC) tablet 5 mg  5 mg Oral Daily Vincenzo Zamora MD   5 mg at 04/23/25 0843    aspirin EC tablet 81 mg  81 mg Oral BID Misha Jones MD   81 mg at 04/23/25 0843    polyethylene glycol (MIRALAX) Packet 17 g  17 g Oral Daily Misha Jones MD   17 g at 04/23/25 0843    pregabalin (LYRICA) capsule 50 mg  50 mg Oral BID Bhakti Lynn APRN CNP        rosuvastatin (CRESTOR) tablet 40 mg  40 mg Oral At Bedtime Vincenzo Zamora MD   40 mg at 04/22/25 2056    senna-docusate (SENOKOT-S/PERICOLACE) 8.6-50 MG per tablet 1 tablet  1 tablet Oral BID Misha Jones MD   1 tablet at 04/23/25 0843    sertraline (ZOLOFT) tablet 100 mg  100 mg Oral Daily Vincenzo Zamora MD   100 mg at 04/23/25 0843    sodium chloride (PF) 0.9% PF flush 3 mL  3 mL Intracatheter Q8H Misha Jones MD   3 mL at 04/23/25 0409    tamsulosin (FLOMAX) capsule 0.8 mg  0.8 mg Oral QPM Vincenzo Zamora MD   0.8 mg at 04/22/25 2056       Data reviewed today: I personally reviewed all new medications, labs, imaging/diagnostics reports over the past 24 hours. Pertinent findings include:    Imaging:   Recent Results (from the past 24 hours)   XR Pelvis w Hip Port Right 1 View    Narrative    EXAM: XR PELVIS AND HIP PORTABLE RIGHT 1 VIEW  LOCATION: [facility]  DATE: 4/22/2025    INDICATION: Status post Hip surgery  COMPARISON: 10/26/2024      Impression    IMPRESSION: Postoperative changes of right total hip arthroplasty. The hardware is in expected alignment. No acute periprosthetic fracture. Posttraumatic and postoperative deformity of the right proximal femur. Small osseous excrescence along the medial   aspect of the proximal femoral shaft, which may be related to a prior injury. Postsurgical gas in the soft  tissues surrounding the right hip.     No results found for this or any previous visit (from the past 4320 hours).    Labs:  XR Pelvis w Hip Port Right 1 View   Final Result   IMPRESSION: Postoperative changes of right total hip arthroplasty. The hardware is in expected alignment. No acute periprosthetic fracture. Posttraumatic and postoperative deformity of the right proximal femur. Small osseous excrescence along the medial    aspect of the proximal femoral shaft, which may be related to a prior injury. Postsurgical gas in the soft tissues surrounding the right hip.      XR Hip Right 1 View   Final Result   IMPRESSION: Intraoperative radiographs during a right total hip arthroplasty. Femoral trial components are in place. The acetabular component is in place.         Recent Results (from the past 24 hours)   Glucose by meter    Collection Time: 04/23/25  6:25 AM   Result Value Ref Range    GLUCOSE BY METER POCT 112 (H) 70 - 99 mg/dL   Hemoglobin    Collection Time: 04/23/25  6:41 AM   Result Value Ref Range    Hemoglobin 11.6 (L) 13.3 - 17.7 g/dL   Extra Green Top (Lithium Heparin) Tube    Collection Time: 04/23/25  6:41 AM   Result Value Ref Range    Hold Specimen JIC        Pending Labs:  Unresulted Labs Ordered in the Past 30 Days of this Admission       No orders found for last 31 day(s).              Vincenzo Zamora MD  [facility]  Phone: #[phone]

## 2025-04-23 NOTE — PROGRESS NOTES
04/23/25 0726   Appointment Info   Signing Clinician's Name / Credentials (OT) Alonso Child OTR/L   Quick Adds   Quick Adds Certification   Living Environment   People in Home spouse   Current Living Arrangements house;other (see comments)  (2 levels - able to stay on one level initially.)   Living Environment Comments WIS with GB and shower chair. Std toilet with support on the L side   Self-Care   Usual Activity Tolerance good   Current Activity Tolerance good   Instrumental Activities of Daily Living (IADL)   Previous Responsibilities meal prep;housekeeping;laundry;shopping;medication management;finances;driving   General Information   Onset of Illness/Injury or Date of Surgery 04/22/25   Referring Physician Dr. Misha Jones   Patient/Family Therapy Goal Statement (OT) To return home with family.   Additional Occupational Profile Info/Pertinent History of Current Problem Adm for R STORM.  PMH: HTN, hyperlipidemia, chronic low back pain with spinal cord stimulator, insomnia, allergies, anxiety, hypogonadism, restless legs, BPH, elevated TSH.   Existing Precautions/Restrictions (S)  no hip ADD past midline;90 degree hip flexion;no hip IR   Right Lower Extremity (Weight-bearing Status) weight-bearing as tolerated (WBAT)   Cognitive Status Examination   Follows Commands WNL   Visual Perception   Visual Impairment/Limitations corrective lenses for reading   Bed Mobility   Bed Mobility rolling left;supine-sit;sit-supine   Rolling Left [place] (Bed Mobility) minimum assist (75% patient effort)   Supine-Sit [place] (Bed Mobility) minimum assist (75% patient effort)   Sit-Supine [place] (Bed Mobility) minimum assist (75% patient effort)   Transfers   Transfers bed-chair transfer;sit-stand transfer;toilet transfer   Transfer Skill: Bed to Chair/Chair to Bed   Bed-Chair [place] (Transfers) supervision;verbal cues   Assistive Device (Bed-Chair Transfers) rolling walker   Sit-Stand Transfer    Sit-Stand [place] (Transfers) supervision;verbal cues   Assistive Device (Sit-Stand Transfers) walker, front-wheeled   Toilet Transfer   Type (Toilet Transfer) sit-stand;stand-sit   [place] Level (Toilet Transfer) supervision;verbal cues   Assistive Device (Toilet Transfer) walker, front-wheeled   Balance   Balance Assessment standing dynamic balance   Standing Balance: Dynamic supervision;verbal cues   Position/Device Used, Standing Balance walker, front-wheeled   Activities of Daily Living   BADL Assessment/Intervention upper body dressing;lower body dressing   Upper Body Dressing Assessment/Training   Position (Upper Body Dressing) supported sitting   [place] Level (Upper Body Dressing) upper body dressing skills;don;pull-over garment;independent   Lower Body Dressing Assessment/Training   Position (Lower Body Dressing) supported sitting;supported standing   [place] Level (Lower Body Dressing) lower body dressing skills;don;pants/bottoms;shoes/slippers;socks   Clinical Impression   Criteria for Skilled Therapeutic Interventions Met (OT) Yes, treatment indicated   OT Diagnosis Decreased indep with ADLs and trsfs due to R STORM   OT Problem List-Impairments impacting ADL problems related to;mobility   Assessment of Occupational Performance 3-5 Performance Deficits   Identified Performance Deficits dressing, toileting, bahting, G/H and trsfs   Planned Therapy Interventions (OT) ADL retraining   Clinical Decision Making Complexity (OT) detailed assessment/moderate complexity   Risk & Benefits of therapy have been explained evaluation/treatment results reviewed;participants included;patient   OT Total Evaluation Time   OT Eval, Moderate Complexity Minutes (84345) 15   Therapy Certification   Medical Diagnosis STORM   Start of Care Date 04/23/25   Certification date from 04/23/25   Certification date to 04/24/25   OT Goals   Therapy Frequency (OT) One time eval and treatment   OT Goals Lower Body  Dressing;Toilet Transfer/Toileting   OT: Lower Body Dressing Modified independent;using adaptive equipment;within precautions   OT: Toilet Transfer/Toileting Modified independent;using adaptive equipment;within precautions   Interventions   Interventions Quick Adds Self-Care/Home Management   Self-Care/Home Management   Self-Care/Home Mgmt/ADL, Compensatory, Meal Prep Minutes (42301) 30   Symptoms Noted During/After Treatment (Meal Preparation/Planning Training) none   Treatment Detail/Skilled Intervention Pt sitting in chair when therapist arrived. Reviewed with Pt the STORM precautions. Pt verbalized understanding. Worked on FB dressing. Pt was indep with U/B dressing while sitting. Worked on L/B dressing with SBA for VC for sequencing and use of AE. By end of session, Pt was mod indep with L/B dressing. Worked on room trsfs using the FWW to the bed, chair, toilet, WIS, BR sink. Initially needing SBA for VC for correct hand placement. By end of session, Pt was mod indep using FWW. Pt was shown and practiced bed mobility using pillows between legs when resting on thier L side. Therapist demo the car trsf and kitchen mobility. Pt understood. At end of session, Pt was sitting in the chair with alarm on and call light within reach. RN aware.   OT Discharge Planning   OT Plan D/C OT   OT Discharge Recommendation (DC Rec) other (see comments)  (defer to Ortho Team)   OT Rationale for DC Rec Pt will have assist from family at home.   OT Brief overview of current status Pt has met their OT goals. Mod indep with trsfs and ADLs   OT Total Distance Amb During Session (feet) 24   OT Equipment Needed at Discharge raised toilet seat   Total Session Time   Timed Code Treatment Minutes 30   Total Session Time (sum of timed and untimed services) 45   [facility]                                                                                   OUTPATIENT OCCUPATIONAL THERAPY    PLAN OF TREATMENT FOR  OUTPATIENT REHABILITATION   Patient's Last Name, First Name, TONY EnglishDevang YOB: 1955   Provider's Name   [facility]   Medical Record No.  [id number]     Onset Date: (P) 04/22/25 Start of Care Date: (P) 04/23/25     Medical Diagnosis:  (P) STORM               OT Diagnosis:  Decreased indep with ADLs and trsfs due to R STORM Certification Dates:  From: (P) 04/23/25  To: (P) 04/24/25     See note for plan of treatment, functional goals, and certification details.    I CERTIFY THE NEED FOR THESE SERVICES FURNISHED UNDER        THIS PLAN OF TREATMENT AND WHILE UNDER MY CARE (Physician co-signature of this document indicates review and certification of the therapy plan).

## 2025-04-23 NOTE — PLAN OF CARE
Patient vital signs are at baseline: Yes  Patient able to ambulate as they were prior to admission or with assist devices provided by therapies during their stay:  Yes  Patient MUST void prior to discharge:  Patient needed to be straight cathed this evening. Will be due to void/bladder scan soon.  Patient able to tolerate oral intake:  Yes  Pain has adequate pain control using Oral analgesics:  Yes  Does patient have an identified :  Yes  Has goal D/C date and time been discussed with patient:  Yes, hopefully tomorrow AM, pending ability to void.    Patient is alert and oriented. Call light within reach. Alarms in place.    Carlotta Pardo RN on 4/22/2025 at 11:31 PM

## 2025-04-23 NOTE — PROGRESS NOTES
04/23/25 0726   Appointment Info   Signing Clinician's Name / Credentials (OT) Alonso Child OTR/L   Quick Adds   Quick Adds Certification   Living Environment   People in Home spouse   Current Living Arrangements house;other (see comments)  (2 levels - able to stay on one level initially.)   Living Environment Comments WIS with GB and shower chair. Std toilet with support on the L side   Self-Care   Usual Activity Tolerance good   Current Activity Tolerance good   Instrumental Activities of Daily Living (IADL)   Previous Responsibilities meal prep;housekeeping;laundry;shopping;medication management;finances;driving   General Information   Onset of Illness/Injury or Date of Surgery 04/22/25   Referring Physician Dr. Misha Jones   Patient/Family Therapy Goal Statement (OT) To return home with family.   Additional Occupational Profile Info/Pertinent History of Current Problem Adm for R STORM.  PMH: HTN, hyperlipidemia, chronic low back pain with spinal cord stimulator, insomnia, allergies, anxiety, hypogonadism, restless legs, BPH, elevated TSH.   Existing Precautions/Restrictions (S)  no hip ADD past midline;90 degree hip flexion;no hip IR   Right Lower Extremity (Weight-bearing Status) weight-bearing as tolerated (WBAT)   Cognitive Status Examination   Follows Commands WNL   Visual Perception   Visual Impairment/Limitations corrective lenses for reading   Bed Mobility   Bed Mobility rolling left;supine-sit;sit-supine   Rolling Left [place] (Bed Mobility) minimum assist (75% patient effort)   Supine-Sit [place] (Bed Mobility) minimum assist (75% patient effort)   Sit-Supine [place] (Bed Mobility) minimum assist (75% patient effort)   Transfers   Transfers bed-chair transfer;sit-stand transfer;toilet transfer   Transfer Skill: Bed to Chair/Chair to Bed   Bed-Chair [place] (Transfers) supervision;verbal cues   Assistive Device (Bed-Chair Transfers) rolling walker   Sit-Stand Transfer    Sit-Stand Gordon (Transfers) supervision;verbal cues   Assistive Device (Sit-Stand Transfers) walker, front-wheeled   Toilet Transfer   Type (Toilet Transfer) sit-stand;stand-sit   Gordon Level (Toilet Transfer) supervision;verbal cues   Assistive Device (Toilet Transfer) walker, front-wheeled   Balance   Balance Assessment standing dynamic balance   Standing Balance: Dynamic supervision;verbal cues   Position/Device Used, Standing Balance walker, front-wheeled   Activities of Daily Living   BADL Assessment/Intervention upper body dressing;lower body dressing   Upper Body Dressing Assessment/Training   Position (Upper Body Dressing) supported sitting   Gordon Level (Upper Body Dressing) upper body dressing skills;don;pull-over garment;independent   Lower Body Dressing Assessment/Training   Position (Lower Body Dressing) supported sitting;supported standing   Gordon Level (Lower Body Dressing) lower body dressing skills;don;pants/bottoms;shoes/slippers;socks   Clinical Impression   Criteria for Skilled Therapeutic Interventions Met (OT) Yes, treatment indicated   OT Diagnosis Decreased indep with ADLs and trsfs due to R STORM   OT Problem List-Impairments impacting ADL problems related to;mobility   Assessment of Occupational Performance 3-5 Performance Deficits   Identified Performance Deficits dressing, toileting, bahting, G/H and trsfs   Planned Therapy Interventions (OT) ADL retraining   Clinical Decision Making Complexity (OT) detailed assessment/moderate complexity   Risk & Benefits of therapy have been explained evaluation/treatment results reviewed;participants included;patient   OT Total Evaluation Time   OT Eval, Moderate Complexity Minutes (30487) 15   Therapy Certification   Medical Diagnosis STORM   Start of Care Date 04/23/25   Certification date from 04/23/25   Certification date to 04/24/25   OT Goals   Therapy Frequency (OT) One time eval and treatment   OT Goals Lower Body  Dressing;Toilet Transfer/Toileting   OT: Lower Body Dressing Modified independent;using adaptive equipment;within precautions   OT: Toilet Transfer/Toileting Modified independent;using adaptive equipment;within precautions   Interventions   Interventions Quick Adds Self-Care/Home Management   Self-Care/Home Management   Self-Care/Home Mgmt/ADL, Compensatory, Meal Prep Minutes (34284) 45   Symptoms Noted During/After Treatment (Meal Preparation/Planning Training) none   Treatment Detail/Skilled Intervention Pt sitting in chair when therapist arrived. Reviewed with Pt the STORM precautions. Pt verbalized understanding. Worked on FB dressing. Pt was indep with U/B dressing while sitting. Worked on L/B dressing with SBA for VC for sequencing and use of AE. By end of session, Pt was mod indep with L/B dressing. Worked on room trsfs using the FWW to the bed, chair, toilet, WIS, BR sink. Initially needing SBA for VC for correct hand placement. By end of session, Pt was mod indep using FWW. Pt was shown and practiced bed mobility using pillows between legs when resting on thier L side. Therapist demo the car trsf and kitchen mobility. Pt understood. At end of session, Pt was sitting in the chair with alarm on and call light within reach. RN aware.   OT Discharge Planning   OT Plan D/C OT   OT Discharge Recommendation (DC Rec) other (see comments)  (defer to Ortho Team)   OT Rationale for DC Rec Pt will have assist from family at home.   OT Brief overview of current status Pt has met their OT goals. Mod indep with trsfs and ADLs   OT Total Distance Amb During Session (feet) 24   OT Equipment Needed at Discharge raised toilet seat   Total Session Time   Timed Code Treatment Minutes 45   Total Session Time (sum of timed and untimed services) 60   [facility]                                                                                   OUTPATIENT OCCUPATIONAL THERAPY    PLAN OF TREATMENT FOR  OUTPATIENT REHABILITATION   Patient's Last Name, First Name, TONY EnglishDevang YOB: 1955   Provider's Name   [facility]   Medical Record No.  [id number]     Onset Date: 04/22/25 Start of Care Date: 04/23/25     Medical Diagnosis:  STORM               OT Diagnosis:  Decreased indep with ADLs and trsfs due to R STORM Certification Dates:  From: 04/23/25  To: 04/24/25     See note for plan of treatment, functional goals, and certification details.    I CERTIFY THE NEED FOR THESE SERVICES FURNISHED UNDER        THIS PLAN OF TREATMENT AND WHILE UNDER MY CARE (Physician co-signature of this document indicates review and certification of the therapy plan).

## 2025-04-23 NOTE — PLAN OF CARE
[id number]: A&O4. VSS on RA. Up A1 GB and walker. Regular diet. C/o minimal pain, scheduled meds given. Dressing CDI with scant dried drainage. Marked, not extended. CMS intact. LS clear. Denies SOB/CP. BS hypoactive. Passing gas. Denies nausea. Nation in place, urine bloody/dark red in color. UA sent to lab, UC pending. Possible discharge later today with nation, pending. Plan to discharge with nation. Continue to monitor     Goal Outcome Evaluation:  Problem: Hip Arthroplasty  Goal: Optimal Functional Ability  Outcome: Progressing  Intervention: Promote Optimal Functional Status  Recent Flowsheet Documentation  Taken 4/23/2025 0840 by Cynthia Corral, RN  Assistive Device Utilized:   gait belt   walker  Activity Management: up in chair     Problem: Hip Arthroplasty  Goal: Optimal Pain Control and Function  Outcome: Progressing  Intervention: Prevent or Manage Pain  Recent Flowsheet Documentation  Taken 4/23/2025 0740 by Cynthia Corral, RN  Pain Management Interventions: medication (see MAR)

## 2025-04-23 NOTE — PROGRESS NOTES
Occupational Therapy Discharge Summary    Reason for therapy discharge:    All goals and outcomes met, no further needs identified.    Progress towards therapy goal(s). See goals on Care Plan in [facility] electronic health record for goal details.  Goals met    Therapy recommendation(s):    No further therapy is recommended.

## 2025-04-23 NOTE — DISCHARGE SUMMARY
ORTHOPEDIC DISCHARGE SUMMARY       Devang English,  1955, MRN [id number]    Admission Date: 2025      Admission Diagnoses: Primary osteoarthritis of right hip [M16.11]     Discharge Date:      Post-operative Day:  1 Day Post-Op    Reason for Admission: The patient was admitted for the following: Procedure(s):  RIGHT POSTERIOR TOTAL HIP ARTHROPLASTY    BRIEF HOSPITAL COURSE   Devang English is a pleasant 69 year old male who underwent the aforementioned procedure with Dr. Jones on 25. There were no intraoperative complications and the patient was transferred to the recovery room and later the orthopedic unit in stable condition. Once the patient reached the orthopedic floor our orthopedic pain protocol was implemented along with the following:    Anticoagulation Medications: ASA  Therapy: PT and OT  Activity: WBAT  Bracing: None    Consultations during Admission: Hospitalist service for medical management     COMPLICATIONS/SIGNIFICANT FINDINGS    None    DISCHARGE INFORMATION   Condition at discharge: Good  Discharge destination: Home  Patient was seen by myself on the date of discharge.    FOLLOW UP CARE   Follow up with orthopedics in 2 weeks or sooner should the need arise. Ortho will continue to manage pain control, post op anticoagulation and incision care.     Follow up with your PCP for management of chronic medical problems and to evaluate for post op medical complications including constipation, nausea/vomiting, DVT/PE, anemia, changes in blood pressure, fevers/chills, urinary retention and atelectasis/pneumonia.     Subjective   Patient is doing well on POD1. He is resting comfortably in his chair. He had just finished working with OT which went well, but is feeling a little sore from. His pain is well managed with his current regimen. He has been needing IV Toradol this morning. He was unable to void and had a nation palced this morning. He has been passing gas, but has not yet had a  "BM.     Physical Exam   /72 (BP Location: Left arm, Patient Position: Chair, Cuff Size: Adult Regular)   Pulse 67   Temp 97.8  F (36.6  C) (Oral)   Resp 16   Ht 1.778 m (5' 10\")   Wt 83.9 kg (185 lb)   SpO2 93%   BMI 26.54 kg/m    The patient is A&Ox3. Appears comfortable.   Sensation is intact.  Dorsiflexion and plantar flexion is intact.  Dorsalis pedis pulse intact.  Calves are soft and non-tender. Negative Deandre's.  The incision is covered. Dressing C/D/I.    Pertinent Results at Discharge     Hemoglobin   Date/Time Value Ref Range Status   04/23/2025 06:41 AM 11.6 (L) 13.3 - 17.7 g/dL Final       Problem List   Principal Problem:    Post traumatic osteoarthritis of hip  Active Problems:    Chronic low back pain    History of hip surgery    Anxiety    Benign localized prostatic hyperplasia with lower urinary tract symptoms (LUTS)    Essential hypertension    Insomnia    Restless leg syndrome    Acute urinary retention      Priscilla Coburn PA-C/Dr. Jones  [facility]  [phone]  Date: 4/23/2025  Time: 2:34 PM   "

## 2025-04-23 NOTE — PROGRESS NOTES
"Orthopedic Progress Note      Assessment: 1 Day Post-Op  S/P Procedure(s):  RIGHT POSTERIOR TOTAL HIP ARTHROPLASTY @    Plan:   - Continue PT/OT  - Weightbearing status: WBAT RLE, Posterior hip precautions  - Anticoagulation: ASA in addition to SCDs, pallavi stockings and early ambulation.  - Discharge planning: Pending medical clearance/ability to void and transition from IV to PO pain medicine.       Subjective:  Pain: mild  Chest pain, SOB: no  Nausea, Vomiting:  no  Lightheadedness, Dizziness:  no  Neuro:  Patient denies new onset numbness or paresthesias    Patient is doing well on POD1. He is resting comfortably in his chair. He had just finished working with OT which went well, but is feeling a little sore from. His pain is well managed with his current regimen. He has been needing IV Toradol this morning. He was unable to void and had a nation palced this morning. He has been passing gas, but has not yet had a BM.     Objective:  /72 (BP Location: Left arm, Patient Position: Chair, Cuff Size: Adult Regular)   Pulse 67   Temp 97.8  F (36.6  C) (Oral)   Resp 16   Ht 1.778 m (5' 10\")   Wt 83.9 kg (185 lb)   SpO2 93%   BMI 26.54 kg/m    The patient is A&Ox3. Appears comfortable.   Sensation is intact.  Dorsiflexion and plantar flexion is intact.  Dorsalis pedis pulse intact.  Calves are soft and non-tender. Negative Deandre's.  The incision is covered. Dressing C/D/I.      Pertinent Labs   Lab Results: personally reviewed.   No results found for: \"INR\", \"PROTIME\"  Lab Results   Component Value Date    HGB 11.6 (L) 04/23/2025     No results found for: \"NA\", \"CO2\"      Report completed by:  Priscilla Coburn PA-C/[facility]    Date: 4/23/2025  Time: 8:45 AM     "

## 2025-04-24 ENCOUNTER — HOSPITAL ENCOUNTER (EMERGENCY)
Facility: CLINIC | Age: 70
Discharge: HOME | End: 2025-04-25
Attending: STUDENT IN AN ORGANIZED HEALTH CARE EDUCATION/TRAINING PROGRAM
Payer: COMMERCIAL

## 2025-04-24 DIAGNOSIS — R31.0 GROSS HEMATURIA: ICD-10-CM

## 2025-04-24 DIAGNOSIS — R33.8 ACUTE URINARY RETENTION: Primary | ICD-10-CM

## 2025-04-24 LAB — BACTERIA UR CULT: NO GROWTH

## 2025-04-24 PROCEDURE — 99283 EMERGENCY DEPT VISIT LOW MDM: CPT

## 2025-04-24 PROCEDURE — 250N000009 HC RX 250: Performed by: STUDENT IN AN ORGANIZED HEALTH CARE EDUCATION/TRAINING PROGRAM

## 2025-04-24 PROCEDURE — 250N000013 HC RX MED GY IP 250 OP 250 PS 637: Performed by: STUDENT IN AN ORGANIZED HEALTH CARE EDUCATION/TRAINING PROGRAM

## 2025-04-24 PROCEDURE — 258N000001 HC RX 258: Performed by: STUDENT IN AN ORGANIZED HEALTH CARE EDUCATION/TRAINING PROGRAM

## 2025-04-24 PROCEDURE — 81001 URINALYSIS AUTO W/SCOPE: CPT | Performed by: STUDENT IN AN ORGANIZED HEALTH CARE EDUCATION/TRAINING PROGRAM

## 2025-04-24 RX ORDER — LIDOCAINE HYDROCHLORIDE 20 MG/ML
6 JELLY TOPICAL ONCE
Status: COMPLETED | OUTPATIENT
Start: 2025-04-24 | End: 2025-04-24

## 2025-04-24 RX ORDER — OXYCODONE HYDROCHLORIDE 5 MG/1
5 TABLET ORAL ONCE
Status: COMPLETED | OUTPATIENT
Start: 2025-04-24 | End: 2025-04-24

## 2025-04-24 RX ADMIN — OXYCODONE HYDROCHLORIDE 5 MG: 5 TABLET ORAL at 22:58

## 2025-04-24 RX ADMIN — SODIUM CHLORIDE FOR IRRIGATION 3000 ML: 0.9 SOLUTION IRRIGATION at 22:20

## 2025-04-24 RX ADMIN — LIDOCAINE HYDROCHLORIDE 6 ML: 20 JELLY TOPICAL at 22:20

## 2025-04-24 ASSESSMENT — ACTIVITIES OF DAILY LIVING (ADL)
ADLS_ACUITY_SCORE: 51

## 2025-04-24 ASSESSMENT — COLUMBIA-SUICIDE SEVERITY RATING SCALE - C-SSRS
2. HAVE YOU ACTUALLY HAD ANY THOUGHTS OF KILLING YOURSELF IN THE PAST MONTH?: NO
1. IN THE PAST MONTH, HAVE YOU WISHED YOU WERE DEAD OR WISHED YOU COULD GO TO SLEEP AND NOT WAKE UP?: NO
6. HAVE YOU EVER DONE ANYTHING, STARTED TO DO ANYTHING, OR PREPARED TO DO ANYTHING TO END YOUR LIFE?: NO

## 2025-04-25 VITALS
BODY MASS INDEX: 26.48 KG/M2 | TEMPERATURE: 98.3 F | HEART RATE: 82 BPM | OXYGEN SATURATION: 95 % | RESPIRATION RATE: 16 BRPM | HEIGHT: 70 IN | SYSTOLIC BLOOD PRESSURE: 139 MMHG | WEIGHT: 185 LBS | DIASTOLIC BLOOD PRESSURE: 86 MMHG

## 2025-04-25 LAB
ALBUMIN UR-MCNC: 30 MG/DL
APPEARANCE UR: ABNORMAL
BACTERIA #/AREA URNS HPF: ABNORMAL /HPF
BILIRUB UR QL STRIP: NEGATIVE
COLOR UR AUTO: ABNORMAL
GLUCOSE UR STRIP-MCNC: NEGATIVE MG/DL
HGB UR QL STRIP: ABNORMAL
KETONES UR STRIP-MCNC: NEGATIVE MG/DL
LEUKOCYTE ESTERASE UR QL STRIP: NEGATIVE
MUCOUS THREADS #/AREA URNS LPF: PRESENT /LPF
NITRATE UR QL: NEGATIVE
PH UR STRIP: 8 [PH] (ref 5–7)
RBC URINE: >182 /HPF
SP GR UR STRIP: 1.02 (ref 1–1.03)
UROBILINOGEN UR STRIP-MCNC: NORMAL MG/DL
WBC URINE: 10 /HPF

## 2025-04-25 NOTE — DISCHARGE INSTRUCTIONS
Thank you for allowing us to evaluate you today.  Follow up with  urology as scheduled for reevaluation..  Please read the guidance provided with your discharge instructions.  Immediately return to the emergency department with any concerns.

## 2025-04-25 NOTE — ED PROVIDER NOTES
"  Emergency Department Note      History of Present Illness     Chief Complaint   Catheter Problem    HPI   Devang English is a very pleasant 69 year old male presenting with a bloody nation catheter. Patient reports having a total hip repair 2 days ago (25). He states there was significant difficulty when placing a straight catheter. A nation catheter was inserted successfully, however there was immediate bleeding. Today (25) at 1800 he noticed that his urine was coffee colored and the catheter had stopped draining. Denies associated penis pain.     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  Lower urinary tract disorder  Benign localized prostatic hyperplasia with lower urinary tract symptoms  Elevated TSH  Hyperprolactinemia  Adjustment disorder with anxiety  Fibromyalgia  Hyperlipidemia  Enlarged aorta  Generalized anxiety disorder  Chronic prostatitis      Medications   Amlodipine  Tamsulosin  Gabapentin  Sertraline  Rosuvastatin  Pramipexole  Hydromorphone  Pregabalin  Cefadroxil  Aspirin    Surgical History   Inguinal hernia repair, bilateral  Shoulder arthroplasty  Appendectomy  Cholecystectomy  Lasik  Vasectomy  Circumcision  Knee replacement  Gallbladder surgery  Spine surgery    Physical Exam     Patient Vitals for the past 24 hrs:   BP Temp Temp src Pulse Resp SpO2 Height Weight   25 2035 125/87 98.3  F (36.8  C) Oral 79 16 94 % 1.778 m (5' 10\") 83.9 kg (185 lb)     Physical Exam  {List of [place] Exams:807701::\" \"}    Diagnostics     Lab Results   Labs Ordered and Resulted from Time of ED Arrival to Time of ED Departure - No data to display    Imaging   No orders to display       EKG   {SEC::\"No ECG " "performed.\"}     Independent Interpretation   {IndependentReview:140247::\"None\"}    ED Course      Medications Administered   Medications   lidocaine (XYLOCAINE) 2 % external gel 6 mL (has no administration in time range)   sodium chloride 0.9% irrigation (bag) (has no administration in time range)       Procedures   Procedures   {Procedure Notes:152833::\"None performed\"}    Discussion of Management   {Consults/Care Discussions:100301::\"None\"}    ED Course        Additional Documentation  {EPPAAdditionalPhrase:691661::\"None\"}    Medical Decision Making / Diagnosis     CMS Diagnoses: {Sepsis/Septic Shock/Stemi/Stroke:960031::\"None\"}    MIPS       {EPPA MIPS:644092::\"None\"}    MDM   {MDM simple  :028319}    {Decision Rule Calculators:503605}  {SDCCHECKLIST:701036}    {Critical care:351883::\" \"}    Disposition   {EPPAFV Dispo:870537}    Diagnosis   No diagnosis found.     Discharge Medications   New Prescriptions    No medications on file     Scribe Disclosure:  I, Cosme Pollard, am serving as a scribe at 10:15 PM on 4/24/2025 to document services personally performed by Viktor Vargas MD based on my observations and the provider's statements to me.    " with gross hematuria and blocked Calhoun catheter.  Suspect traumatic urethral injury given report of initial placement of catheter.  We initially flushed out blood clots and then needed to perform continuous bladder irrigation until urine was clear.  We did obtain urinalysis, which is reassuring against urinary tract infection.  Patient had relief of abdominal discomfort after Calhoun catheter was unblocked.  Will plan for him to follow-up with urology as planned for trial of voiding.    Disposition   The patient was discharged.     Diagnosis     ICD-10-CM    1. Acute urinary retention  R33.8       2. Gross hematuria  R31.0            Discharge Medications   Discharge Medication List as of 4/25/2025 12:34 AM        Scribe Disclosure:  I, Cosme Pollard, am serving as a scribe at 10:15 PM on 4/24/2025 to document services personally performed by Viktor Vargas MD based on my observations and the provider's statements to me.       Viktor Vargas MD  04/25/25 0122

## 2025-04-25 NOTE — ED TRIAGE NOTES
Patient arrives from home with bloody urine in nation catheter. Patient had a total hip replacement 2 days ago and they had a difficult time inserting the catheter. Patient had 1500mL output this morning that was darker but not bloody like it is now. On arrival AVSS, 5/10 pain. A&Ox4, ambulatory with walker.     5mg oxy at 4pm  2 tylenol at 2pm      Triage Assessment (Adult)       Row Name 04/24/25 2016          Triage Assessment    Airway WDL WDL        Respiratory WDL    Respiratory WDL WDL        Skin Circulation/Temperature WDL    Skin Circulation/Temperature WDL WDL        Cardiac WDL    Cardiac WDL WDL        Peripheral/Neurovascular WDL    Peripheral Neurovascular WDL WDL        Cognitive/Neuro/Behavioral WDL    Cognitive/Neuro/Behavioral WDL WDL

## 2025-04-29 ENCOUNTER — HOSPITAL ENCOUNTER (INPATIENT)
Facility: CLINIC | Age: 70
LOS: 2 days | Discharge: HOME | End: 2025-05-01
Attending: STUDENT IN AN ORGANIZED HEALTH CARE EDUCATION/TRAINING PROGRAM
Payer: COMMERCIAL

## 2025-04-29 ENCOUNTER — APPOINTMENT (OUTPATIENT)
Dept: CT IMAGING | Facility: CLINIC | Age: 70
End: 2025-04-29
Attending: STUDENT IN AN ORGANIZED HEALTH CARE EDUCATION/TRAINING PROGRAM
Payer: COMMERCIAL

## 2025-04-29 ENCOUNTER — APPOINTMENT (OUTPATIENT)
Dept: ULTRASOUND IMAGING | Facility: CLINIC | Age: 70
End: 2025-04-29
Attending: STUDENT IN AN ORGANIZED HEALTH CARE EDUCATION/TRAINING PROGRAM
Payer: COMMERCIAL

## 2025-04-29 DIAGNOSIS — R41.0 CONFUSION: ICD-10-CM

## 2025-04-29 DIAGNOSIS — M25.551 HIP PAIN, RIGHT: ICD-10-CM

## 2025-04-29 DIAGNOSIS — N39.0 URINARY TRACT INFECTION WITHOUT HEMATURIA, SITE UNSPECIFIED: ICD-10-CM

## 2025-04-29 LAB
ALBUMIN SERPL BCG-MCNC: 4.1 G/DL (ref 3.5–5.2)
ALBUMIN UR-MCNC: NEGATIVE MG/DL
ALP SERPL-CCNC: 74 U/L (ref 40–150)
ALT SERPL W P-5'-P-CCNC: 69 U/L (ref 0–70)
ANION GAP SERPL CALCULATED.3IONS-SCNC: 12 MMOL/L (ref 7–15)
APPEARANCE UR: CLEAR
AST SERPL W P-5'-P-CCNC: 81 U/L (ref 0–45)
BASOPHILS # BLD AUTO: 0 10E3/UL (ref 0–0.2)
BASOPHILS NFR BLD AUTO: 0 %
BILIRUB SERPL-MCNC: 0.5 MG/DL
BILIRUB UR QL STRIP: NEGATIVE
BUN SERPL-MCNC: 12.9 MG/DL (ref 8–23)
CALCIUM SERPL-MCNC: 9.7 MG/DL (ref 8.8–10.4)
CHLORIDE SERPL-SCNC: 98 MMOL/L (ref 98–107)
COLOR UR AUTO: ABNORMAL
CREAT SERPL-MCNC: 0.86 MG/DL (ref 0.67–1.17)
EGFRCR SERPLBLD CKD-EPI 2021: >90 ML/MIN/1.73M2
EOSINOPHIL # BLD AUTO: 0.1 10E3/UL (ref 0–0.7)
EOSINOPHIL NFR BLD AUTO: 1 %
ERYTHROCYTE [DISTWIDTH] IN BLOOD BY AUTOMATED COUNT: 13.4 % (ref 10–15)
GLUCOSE SERPL-MCNC: 104 MG/DL (ref 70–99)
GLUCOSE UR STRIP-MCNC: NEGATIVE MG/DL
HCO3 SERPL-SCNC: 24 MMOL/L (ref 22–29)
HCT VFR BLD AUTO: 33.8 % (ref 40–53)
HGB BLD-MCNC: 11.7 G/DL (ref 13.3–17.7)
HGB UR QL STRIP: ABNORMAL
HOLD SPECIMEN: NORMAL
IMM GRANULOCYTES # BLD: 0 10E3/UL
IMM GRANULOCYTES NFR BLD: 0 %
KETONES UR STRIP-MCNC: NEGATIVE MG/DL
LACTATE SERPL-SCNC: 1.3 MMOL/L (ref 0.7–2)
LEUKOCYTE ESTERASE UR QL STRIP: ABNORMAL
LYMPHOCYTES # BLD AUTO: 0.4 10E3/UL (ref 0.8–5.3)
LYMPHOCYTES NFR BLD AUTO: 4 %
MCH RBC QN AUTO: 30.8 PG (ref 26.5–33)
MCHC RBC AUTO-ENTMCNC: 34.6 G/DL (ref 31.5–36.5)
MCV RBC AUTO: 89 FL (ref 78–100)
MONOCYTES # BLD AUTO: 0.5 10E3/UL (ref 0–1.3)
MONOCYTES NFR BLD AUTO: 4 %
NEUTROPHILS # BLD AUTO: 10.7 10E3/UL (ref 1.6–8.3)
NEUTROPHILS NFR BLD AUTO: 91 %
NITRATE UR QL: NEGATIVE
NRBC # BLD AUTO: 0 10E3/UL
NRBC BLD AUTO-RTO: 0 /100
PH UR STRIP: 8 [PH] (ref 5–7)
PLATELET # BLD AUTO: 264 10E3/UL (ref 150–450)
POTASSIUM SERPL-SCNC: 4 MMOL/L (ref 3.4–5.3)
PROT SERPL-MCNC: 6.9 G/DL (ref 6.4–8.3)
RBC # BLD AUTO: 3.8 10E6/UL (ref 4.4–5.9)
RBC URINE: 31 /HPF
SODIUM SERPL-SCNC: 134 MMOL/L (ref 135–145)
SP GR UR STRIP: 1.02 (ref 1–1.03)
UROBILINOGEN UR STRIP-MCNC: 8 MG/DL
WBC # BLD AUTO: 11.8 10E3/UL (ref 4–11)
WBC URINE: 32 /HPF

## 2025-04-29 PROCEDURE — 99285 EMERGENCY DEPT VISIT HI MDM: CPT | Mod: 25

## 2025-04-29 PROCEDURE — 96365 THER/PROPH/DIAG IV INF INIT: CPT

## 2025-04-29 PROCEDURE — 84484 ASSAY OF TROPONIN QUANT: CPT | Performed by: INTERNAL MEDICINE

## 2025-04-29 PROCEDURE — 120N000001 HC R&B MED SURG/OB

## 2025-04-29 PROCEDURE — 70498 CT ANGIOGRAPHY NECK: CPT

## 2025-04-29 PROCEDURE — 93971 EXTREMITY STUDY: CPT | Mod: RT

## 2025-04-29 PROCEDURE — 250N000011 HC RX IP 250 OP 636: Performed by: STUDENT IN AN ORGANIZED HEALTH CARE EDUCATION/TRAINING PROGRAM

## 2025-04-29 PROCEDURE — 83605 ASSAY OF LACTIC ACID: CPT | Performed by: STUDENT IN AN ORGANIZED HEALTH CARE EDUCATION/TRAINING PROGRAM

## 2025-04-29 PROCEDURE — 81001 URINALYSIS AUTO W/SCOPE: CPT | Performed by: STUDENT IN AN ORGANIZED HEALTH CARE EDUCATION/TRAINING PROGRAM

## 2025-04-29 PROCEDURE — 96375 TX/PRO/DX INJ NEW DRUG ADDON: CPT

## 2025-04-29 PROCEDURE — 87186 SC STD MICRODIL/AGAR DIL: CPT | Performed by: STUDENT IN AN ORGANIZED HEALTH CARE EDUCATION/TRAINING PROGRAM

## 2025-04-29 PROCEDURE — 36415 COLL VENOUS BLD VENIPUNCTURE: CPT | Performed by: STUDENT IN AN ORGANIZED HEALTH CARE EDUCATION/TRAINING PROGRAM

## 2025-04-29 PROCEDURE — 84145 PROCALCITONIN (PCT): CPT | Performed by: INTERNAL MEDICINE

## 2025-04-29 PROCEDURE — 85004 AUTOMATED DIFF WBC COUNT: CPT | Performed by: STUDENT IN AN ORGANIZED HEALTH CARE EDUCATION/TRAINING PROGRAM

## 2025-04-29 PROCEDURE — 258N000003 HC RX IP 258 OP 636: Performed by: STUDENT IN AN ORGANIZED HEALTH CARE EDUCATION/TRAINING PROGRAM

## 2025-04-29 PROCEDURE — 84132 ASSAY OF SERUM POTASSIUM: CPT | Performed by: STUDENT IN AN ORGANIZED HEALTH CARE EDUCATION/TRAINING PROGRAM

## 2025-04-29 PROCEDURE — 87040 BLOOD CULTURE FOR BACTERIA: CPT | Performed by: STUDENT IN AN ORGANIZED HEALTH CARE EDUCATION/TRAINING PROGRAM

## 2025-04-29 PROCEDURE — 96361 HYDRATE IV INFUSION ADD-ON: CPT

## 2025-04-29 PROCEDURE — 250N000011 HC RX IP 250 OP 636: Mod: JZ | Performed by: STUDENT IN AN ORGANIZED HEALTH CARE EDUCATION/TRAINING PROGRAM

## 2025-04-29 RX ORDER — IOPAMIDOL 755 MG/ML
75 INJECTION, SOLUTION INTRAVASCULAR ONCE
Status: COMPLETED | OUTPATIENT
Start: 2025-04-29 | End: 2025-04-29

## 2025-04-29 RX ORDER — HYDROMORPHONE HYDROCHLORIDE 1 MG/ML
0.5 INJECTION, SOLUTION INTRAMUSCULAR; INTRAVENOUS; SUBCUTANEOUS ONCE
Status: COMPLETED | OUTPATIENT
Start: 2025-04-29 | End: 2025-04-29

## 2025-04-29 RX ORDER — HYDROMORPHONE HYDROCHLORIDE 4 MG/1
4 TABLET ORAL EVERY 4 HOURS PRN
COMMUNITY
Start: 2025-04-26

## 2025-04-29 RX ORDER — ACETAMINOPHEN 500 MG
500-1000 TABLET ORAL EVERY 6 HOURS PRN
COMMUNITY

## 2025-04-29 RX ORDER — CEFTRIAXONE 2 G/1
2 INJECTION, POWDER, FOR SOLUTION INTRAMUSCULAR; INTRAVENOUS ONCE
Status: COMPLETED | OUTPATIENT
Start: 2025-04-29 | End: 2025-04-29

## 2025-04-29 RX ORDER — POLYETHYLENE GLYCOL 3350 17 G/17G
1 POWDER, FOR SOLUTION ORAL EVERY MORNING
COMMUNITY

## 2025-04-29 RX ADMIN — SODIUM CHLORIDE 1000 ML: 0.9 INJECTION, SOLUTION INTRAVENOUS at 22:38

## 2025-04-29 RX ADMIN — HYDROMORPHONE HYDROCHLORIDE 0.5 MG: 1 INJECTION, SOLUTION INTRAMUSCULAR; INTRAVENOUS; SUBCUTANEOUS at 21:00

## 2025-04-29 RX ADMIN — CEFTRIAXONE SODIUM 2 G: 2 INJECTION, POWDER, FOR SOLUTION INTRAMUSCULAR; INTRAVENOUS at 22:23

## 2025-04-29 RX ADMIN — SODIUM CHLORIDE 1000 ML: 0.9 INJECTION, SOLUTION INTRAVENOUS at 20:57

## 2025-04-29 RX ADMIN — IOPAMIDOL 75 ML: 755 INJECTION, SOLUTION INTRAVENOUS at 23:21

## 2025-04-29 ASSESSMENT — COLUMBIA-SUICIDE SEVERITY RATING SCALE - C-SSRS
6. HAVE YOU EVER DONE ANYTHING, STARTED TO DO ANYTHING, OR PREPARED TO DO ANYTHING TO END YOUR LIFE?: NO
1. IN THE PAST MONTH, HAVE YOU WISHED YOU WERE DEAD OR WISHED YOU COULD GO TO SLEEP AND NOT WAKE UP?: NO
2. HAVE YOU ACTUALLY HAD ANY THOUGHTS OF KILLING YOURSELF IN THE PAST MONTH?: NO

## 2025-04-29 ASSESSMENT — ACTIVITIES OF DAILY LIVING (ADL)
ADLS_ACUITY_SCORE: 49

## 2025-04-29 NOTE — ED TRIAGE NOTES
Patient states he had a total right hip replacement a week ago, today fever 101.0, increased right hip pain, and they attempted to cath him in hospital causing damage to penis and he has blood and pain. He took Dilaudid today. Took Tylenol.      Triage Assessment (Adult)       Row Name 04/29/25 7424          Triage Assessment    Airway WDL WDL        Respiratory WDL    Respiratory WDL WDL        Skin Circulation/Temperature WDL    Skin Circulation/Temperature WDL WDL        Cardiac WDL    Cardiac WDL WDL        Peripheral/Neurovascular WDL    Peripheral Neurovascular WDL WDL        Cognitive/Neuro/Behavioral WDL    Cognitive/Neuro/Behavioral WDL WDL

## 2025-04-30 ENCOUNTER — APPOINTMENT (OUTPATIENT)
Dept: CT IMAGING | Facility: CLINIC | Age: 70
DRG: 864 | End: 2025-04-30
Payer: COMMERCIAL

## 2025-04-30 LAB
ALBUMIN SERPL BCG-MCNC: 3.4 G/DL (ref 3.5–5.2)
ALP SERPL-CCNC: 62 U/L (ref 40–150)
ALT SERPL W P-5'-P-CCNC: 59 U/L (ref 0–70)
ANION GAP SERPL CALCULATED.3IONS-SCNC: 11 MMOL/L (ref 7–15)
AST SERPL W P-5'-P-CCNC: 58 U/L (ref 0–45)
BASOPHILS # BLD AUTO: 0 10E3/UL (ref 0–0.2)
BASOPHILS NFR BLD AUTO: 0 %
BILIRUB SERPL-MCNC: 0.5 MG/DL
BUN SERPL-MCNC: 9.5 MG/DL (ref 8–23)
CALCIUM SERPL-MCNC: 9.1 MG/DL (ref 8.8–10.4)
CHLORIDE SERPL-SCNC: 103 MMOL/L (ref 98–107)
CREAT SERPL-MCNC: 0.7 MG/DL (ref 0.67–1.17)
CRP SERPL-MCNC: 99.2 MG/L
EGFRCR SERPLBLD CKD-EPI 2021: >90 ML/MIN/1.73M2
EOSINOPHIL # BLD AUTO: 0 10E3/UL (ref 0–0.7)
EOSINOPHIL NFR BLD AUTO: 0 %
ERYTHROCYTE [DISTWIDTH] IN BLOOD BY AUTOMATED COUNT: 13.4 % (ref 10–15)
FLUAV RNA SPEC QL NAA+PROBE: NEGATIVE
FLUBV RNA RESP QL NAA+PROBE: NEGATIVE
GLUCOSE SERPL-MCNC: 118 MG/DL (ref 70–99)
HCO3 SERPL-SCNC: 22 MMOL/L (ref 22–29)
HCT VFR BLD AUTO: 29.2 % (ref 40–53)
HGB BLD-MCNC: 10.3 G/DL (ref 13.3–17.7)
IMM GRANULOCYTES # BLD: 0.1 10E3/UL
IMM GRANULOCYTES NFR BLD: 1 %
LYMPHOCYTES # BLD AUTO: 0.3 10E3/UL (ref 0.8–5.3)
LYMPHOCYTES NFR BLD AUTO: 3 %
MAGNESIUM SERPL-MCNC: 2.1 MG/DL (ref 1.7–2.3)
MCH RBC QN AUTO: 30.9 PG (ref 26.5–33)
MCHC RBC AUTO-ENTMCNC: 35.3 G/DL (ref 31.5–36.5)
MCV RBC AUTO: 88 FL (ref 78–100)
MONOCYTES # BLD AUTO: 0.5 10E3/UL (ref 0–1.3)
MONOCYTES NFR BLD AUTO: 5 %
MRSA DNA SPEC QL NAA+PROBE: NEGATIVE
NEUTROPHILS # BLD AUTO: 8.7 10E3/UL (ref 1.6–8.3)
NEUTROPHILS NFR BLD AUTO: 91 %
NRBC # BLD AUTO: 0 10E3/UL
NRBC BLD AUTO-RTO: 0 /100
PLATELET # BLD AUTO: 202 10E3/UL (ref 150–450)
POTASSIUM SERPL-SCNC: 3.6 MMOL/L (ref 3.4–5.3)
PROCALCITONIN SERPL IA-MCNC: 0.17 NG/ML
PROT SERPL-MCNC: 5.6 G/DL (ref 6.4–8.3)
RBC # BLD AUTO: 3.33 10E6/UL (ref 4.4–5.9)
RSV RNA SPEC NAA+PROBE: NEGATIVE
SA TARGET DNA: NEGATIVE
SARS-COV-2 RNA RESP QL NAA+PROBE: NEGATIVE
SODIUM SERPL-SCNC: 136 MMOL/L (ref 135–145)
TROPONIN T SERPL HS-MCNC: 19 NG/L
WBC # BLD AUTO: 9.5 10E3/UL (ref 4–11)

## 2025-04-30 PROCEDURE — 99233 SBSQ HOSP IP/OBS HIGH 50: CPT | Performed by: STUDENT IN AN ORGANIZED HEALTH CARE EDUCATION/TRAINING PROGRAM

## 2025-04-30 PROCEDURE — 87641 MR-STAPH DNA AMP PROBE: CPT | Performed by: STUDENT IN AN ORGANIZED HEALTH CARE EDUCATION/TRAINING PROGRAM

## 2025-04-30 PROCEDURE — 250N000013 HC RX MED GY IP 250 OP 250 PS 637: Performed by: HOSPITALIST

## 2025-04-30 PROCEDURE — 87637 SARSCOV2&INF A&B&RSV AMP PRB: CPT | Performed by: INTERNAL MEDICINE

## 2025-04-30 PROCEDURE — 250N000011 HC RX IP 250 OP 636

## 2025-04-30 PROCEDURE — 99207 PR NO BILLABLE SERVICE THIS VISIT: CPT | Performed by: NURSE PRACTITIONER

## 2025-04-30 PROCEDURE — 250N000013 HC RX MED GY IP 250 OP 250 PS 637: Performed by: INTERNAL MEDICINE

## 2025-04-30 PROCEDURE — 36415 COLL VENOUS BLD VENIPUNCTURE: CPT | Performed by: INTERNAL MEDICINE

## 2025-04-30 PROCEDURE — 120N000001 HC R&B MED SURG/OB

## 2025-04-30 PROCEDURE — 85004 AUTOMATED DIFF WBC COUNT: CPT | Performed by: INTERNAL MEDICINE

## 2025-04-30 PROCEDURE — 83735 ASSAY OF MAGNESIUM: CPT | Performed by: INTERNAL MEDICINE

## 2025-04-30 PROCEDURE — 250N000011 HC RX IP 250 OP 636: Performed by: STUDENT IN AN ORGANIZED HEALTH CARE EDUCATION/TRAINING PROGRAM

## 2025-04-30 PROCEDURE — 99207 PR NO BILLABLE SERVICE THIS VISIT: CPT | Performed by: STUDENT IN AN ORGANIZED HEALTH CARE EDUCATION/TRAINING PROGRAM

## 2025-04-30 PROCEDURE — 86140 C-REACTIVE PROTEIN: CPT

## 2025-04-30 PROCEDURE — 258N000003 HC RX IP 258 OP 636: Performed by: INTERNAL MEDICINE

## 2025-04-30 PROCEDURE — 258N000003 HC RX IP 258 OP 636: Performed by: STUDENT IN AN ORGANIZED HEALTH CARE EDUCATION/TRAINING PROGRAM

## 2025-04-30 PROCEDURE — 250N000013 HC RX MED GY IP 250 OP 250 PS 637: Performed by: EMERGENCY MEDICINE

## 2025-04-30 PROCEDURE — 82310 ASSAY OF CALCIUM: CPT | Performed by: INTERNAL MEDICINE

## 2025-04-30 PROCEDURE — 73701 CT LOWER EXTREMITY W/DYE: CPT | Mod: RT

## 2025-04-30 RX ORDER — PIPERACILLIN SODIUM, TAZOBACTAM SODIUM 3; .375 G/15ML; G/15ML
3.38 INJECTION, POWDER, LYOPHILIZED, FOR SOLUTION INTRAVENOUS EVERY 6 HOURS
Status: DISCONTINUED | OUTPATIENT
Start: 2025-04-30 | End: 2025-05-01

## 2025-04-30 RX ORDER — VANCOMYCIN HYDROCHLORIDE 1 G/200ML
1000 INJECTION, SOLUTION INTRAVENOUS EVERY 12 HOURS
Status: DISCONTINUED | OUTPATIENT
Start: 2025-04-30 | End: 2025-05-01 | Stop reason: HOSPADM

## 2025-04-30 RX ORDER — ROSUVASTATIN CALCIUM 10 MG/1
40 TABLET, COATED ORAL AT BEDTIME
Status: DISCONTINUED | OUTPATIENT
Start: 2025-04-30 | End: 2025-05-01 | Stop reason: HOSPADM

## 2025-04-30 RX ORDER — POLYETHYLENE GLYCOL 3350 17 G/17G
17 POWDER, FOR SOLUTION ORAL EVERY MORNING
Status: DISCONTINUED | OUTPATIENT
Start: 2025-04-30 | End: 2025-05-01 | Stop reason: HOSPADM

## 2025-04-30 RX ORDER — PRAMIPEXOLE DIHYDROCHLORIDE 0.25 MG/1
.25-.5 TABLET ORAL
Status: DISCONTINUED | OUTPATIENT
Start: 2025-04-30 | End: 2025-05-01 | Stop reason: HOSPADM

## 2025-04-30 RX ORDER — ACETAMINOPHEN 325 MG/1
650 TABLET ORAL EVERY 4 HOURS PRN
Status: DISCONTINUED | OUTPATIENT
Start: 2025-04-30 | End: 2025-05-01 | Stop reason: HOSPADM

## 2025-04-30 RX ORDER — IOPAMIDOL 755 MG/ML
90 INJECTION, SOLUTION INTRAVASCULAR ONCE
Status: COMPLETED | OUTPATIENT
Start: 2025-04-30 | End: 2025-04-30

## 2025-04-30 RX ORDER — LIDOCAINE 40 MG/G
CREAM TOPICAL
Status: DISCONTINUED | OUTPATIENT
Start: 2025-04-30 | End: 2025-05-01 | Stop reason: HOSPADM

## 2025-04-30 RX ORDER — NALOXONE HYDROCHLORIDE 0.4 MG/ML
0.4 INJECTION, SOLUTION INTRAMUSCULAR; INTRAVENOUS; SUBCUTANEOUS
Status: DISCONTINUED | OUTPATIENT
Start: 2025-04-30 | End: 2025-05-01 | Stop reason: HOSPADM

## 2025-04-30 RX ORDER — ACETAMINOPHEN 650 MG/1
650 SUPPOSITORY RECTAL EVERY 4 HOURS PRN
Status: DISCONTINUED | OUTPATIENT
Start: 2025-04-30 | End: 2025-05-01 | Stop reason: HOSPADM

## 2025-04-30 RX ORDER — PROCHLORPERAZINE MALEATE 5 MG/1
5 TABLET ORAL EVERY 6 HOURS PRN
Status: DISCONTINUED | OUTPATIENT
Start: 2025-04-30 | End: 2025-05-01 | Stop reason: HOSPADM

## 2025-04-30 RX ORDER — AMLODIPINE BESYLATE 5 MG/1
5 TABLET ORAL DAILY
Status: DISCONTINUED | OUTPATIENT
Start: 2025-04-30 | End: 2025-05-01 | Stop reason: HOSPADM

## 2025-04-30 RX ORDER — ONDANSETRON 4 MG/1
4 TABLET, ORALLY DISINTEGRATING ORAL EVERY 6 HOURS PRN
Status: DISCONTINUED | OUTPATIENT
Start: 2025-04-30 | End: 2025-05-01 | Stop reason: HOSPADM

## 2025-04-30 RX ORDER — CALCIUM CARBONATE 500 MG/1
1000 TABLET, CHEWABLE ORAL 4 TIMES DAILY PRN
Status: DISCONTINUED | OUTPATIENT
Start: 2025-04-30 | End: 2025-05-01 | Stop reason: HOSPADM

## 2025-04-30 RX ORDER — TAMSULOSIN HYDROCHLORIDE 0.4 MG/1
0.8 CAPSULE ORAL EVERY EVENING
Status: DISCONTINUED | OUTPATIENT
Start: 2025-04-30 | End: 2025-05-01 | Stop reason: HOSPADM

## 2025-04-30 RX ORDER — SERTRALINE HYDROCHLORIDE 100 MG/1
100 TABLET, FILM COATED ORAL DAILY
Status: DISCONTINUED | OUTPATIENT
Start: 2025-04-30 | End: 2025-05-01 | Stop reason: HOSPADM

## 2025-04-30 RX ORDER — PREGABALIN 50 MG/1
50 CAPSULE ORAL 2 TIMES DAILY
Status: DISCONTINUED | OUTPATIENT
Start: 2025-04-30 | End: 2025-05-01 | Stop reason: HOSPADM

## 2025-04-30 RX ORDER — VITAMIN B COMPLEX
25 TABLET ORAL DAILY
Status: DISCONTINUED | OUTPATIENT
Start: 2025-04-30 | End: 2025-05-01 | Stop reason: HOSPADM

## 2025-04-30 RX ORDER — AMOXICILLIN 250 MG
1 CAPSULE ORAL 2 TIMES DAILY PRN
Status: DISCONTINUED | OUTPATIENT
Start: 2025-04-30 | End: 2025-05-01 | Stop reason: HOSPADM

## 2025-04-30 RX ORDER — HYDROCODONE BITARTRATE AND ACETAMINOPHEN 5; 325 MG/1; MG/1
1 TABLET ORAL EVERY 4 HOURS PRN
Status: DISCONTINUED | OUTPATIENT
Start: 2025-04-30 | End: 2025-05-01 | Stop reason: HOSPADM

## 2025-04-30 RX ORDER — HYDROMORPHONE HCL IN WATER/PF 6 MG/30 ML
0.2 PATIENT CONTROLLED ANALGESIA SYRINGE INTRAVENOUS EVERY 4 HOURS PRN
Status: DISCONTINUED | OUTPATIENT
Start: 2025-04-30 | End: 2025-05-01 | Stop reason: HOSPADM

## 2025-04-30 RX ORDER — AMOXICILLIN 250 MG
2 CAPSULE ORAL 2 TIMES DAILY PRN
Status: DISCONTINUED | OUTPATIENT
Start: 2025-04-30 | End: 2025-05-01 | Stop reason: HOSPADM

## 2025-04-30 RX ORDER — NALOXONE HYDROCHLORIDE 0.4 MG/ML
0.2 INJECTION, SOLUTION INTRAMUSCULAR; INTRAVENOUS; SUBCUTANEOUS
Status: DISCONTINUED | OUTPATIENT
Start: 2025-04-30 | End: 2025-05-01 | Stop reason: HOSPADM

## 2025-04-30 RX ORDER — ASPIRIN 81 MG/1
81 TABLET ORAL 2 TIMES DAILY
Status: DISCONTINUED | OUTPATIENT
Start: 2025-04-30 | End: 2025-05-01 | Stop reason: HOSPADM

## 2025-04-30 RX ORDER — POTASSIUM CHLORIDE 1500 MG/1
20 TABLET, EXTENDED RELEASE ORAL ONCE
Status: COMPLETED | OUTPATIENT
Start: 2025-04-30 | End: 2025-04-30

## 2025-04-30 RX ORDER — ONDANSETRON 2 MG/ML
4 INJECTION INTRAMUSCULAR; INTRAVENOUS EVERY 6 HOURS PRN
Status: DISCONTINUED | OUTPATIENT
Start: 2025-04-30 | End: 2025-05-01 | Stop reason: HOSPADM

## 2025-04-30 RX ORDER — CEFTRIAXONE 2 G/1
2 INJECTION, POWDER, FOR SOLUTION INTRAMUSCULAR; INTRAVENOUS EVERY 24 HOURS
Status: DISCONTINUED | OUTPATIENT
Start: 2025-04-30 | End: 2025-04-30

## 2025-04-30 RX ORDER — ACETAMINOPHEN 325 MG/1
650 TABLET ORAL ONCE
Status: COMPLETED | OUTPATIENT
Start: 2025-04-30 | End: 2025-04-30

## 2025-04-30 RX ORDER — SODIUM CHLORIDE 9 MG/ML
INJECTION, SOLUTION INTRAVENOUS CONTINUOUS
Status: DISCONTINUED | OUTPATIENT
Start: 2025-04-30 | End: 2025-05-01

## 2025-04-30 RX ORDER — AMOXICILLIN 250 MG
1 CAPSULE ORAL 2 TIMES DAILY
Status: DISCONTINUED | OUTPATIENT
Start: 2025-04-30 | End: 2025-05-01 | Stop reason: HOSPADM

## 2025-04-30 RX ADMIN — Medication 25 MCG: at 07:56

## 2025-04-30 RX ADMIN — IOPAMIDOL 90 ML: 755 INJECTION, SOLUTION INTRAVENOUS at 10:39

## 2025-04-30 RX ADMIN — VANCOMYCIN HYDROCHLORIDE 1000 MG: 1 INJECTION, SOLUTION INTRAVENOUS at 21:23

## 2025-04-30 RX ADMIN — SODIUM CHLORIDE: 0.9 INJECTION, SOLUTION INTRAVENOUS at 02:57

## 2025-04-30 RX ADMIN — SENNOSIDES AND DOCUSATE SODIUM 1 TABLET: 50; 8.6 TABLET ORAL at 07:56

## 2025-04-30 RX ADMIN — PIPERACILLIN AND TAZOBACTAM 3.38 G: 3; .375 INJECTION, POWDER, FOR SOLUTION INTRAVENOUS at 19:48

## 2025-04-30 RX ADMIN — POTASSIUM CHLORIDE 20 MEQ: 1500 TABLET, EXTENDED RELEASE ORAL at 07:56

## 2025-04-30 RX ADMIN — ASPIRIN 81 MG: 81 TABLET, COATED ORAL at 07:56

## 2025-04-30 RX ADMIN — HYDROCODONE BITARTRATE AND ACETAMINOPHEN 1 TABLET: 5; 325 TABLET ORAL at 09:46

## 2025-04-30 RX ADMIN — ACETAMINOPHEN 650 MG: 325 TABLET, FILM COATED ORAL at 19:48

## 2025-04-30 RX ADMIN — PIPERACILLIN AND TAZOBACTAM 3.38 G: 3; .375 INJECTION, POWDER, FOR SOLUTION INTRAVENOUS at 14:12

## 2025-04-30 RX ADMIN — ASPIRIN 81 MG: 81 TABLET, COATED ORAL at 19:48

## 2025-04-30 RX ADMIN — ROSUVASTATIN CALCIUM 40 MG: 10 TABLET, FILM COATED ORAL at 21:22

## 2025-04-30 RX ADMIN — TAMSULOSIN HYDROCHLORIDE 0.8 MG: 0.4 CAPSULE ORAL at 17:15

## 2025-04-30 RX ADMIN — VANCOMYCIN HYDROCHLORIDE 1750 MG: 5 INJECTION, POWDER, LYOPHILIZED, FOR SOLUTION INTRAVENOUS at 14:53

## 2025-04-30 RX ADMIN — POLYETHYLENE GLYCOL 3350 17 G: 17 POWDER, FOR SOLUTION ORAL at 07:55

## 2025-04-30 RX ADMIN — ROSUVASTATIN CALCIUM 40 MG: 10 TABLET, FILM COATED ORAL at 02:51

## 2025-04-30 RX ADMIN — ACETAMINOPHEN 650 MG: 325 TABLET, FILM COATED ORAL at 01:20

## 2025-04-30 RX ADMIN — ACETAMINOPHEN 650 MG: 325 TABLET, FILM COATED ORAL at 12:11

## 2025-04-30 RX ADMIN — HYDROCODONE BITARTRATE AND ACETAMINOPHEN 1 TABLET: 5; 325 TABLET ORAL at 05:52

## 2025-04-30 RX ADMIN — HYDROCODONE BITARTRATE AND ACETAMINOPHEN 1 TABLET: 5; 325 TABLET ORAL at 21:23

## 2025-04-30 ASSESSMENT — ACTIVITIES OF DAILY LIVING (ADL)
ADLS_ACUITY_SCORE: 32
ADLS_ACUITY_SCORE: 51
ADLS_ACUITY_SCORE: 32
ADLS_ACUITY_SCORE: 51
ADLS_ACUITY_SCORE: 32
ADLS_ACUITY_SCORE: 49
ADLS_ACUITY_SCORE: 32
ADLS_ACUITY_SCORE: 49
ADLS_ACUITY_SCORE: 32
ADLS_ACUITY_SCORE: 51
ADLS_ACUITY_SCORE: 49
ADLS_ACUITY_SCORE: 32
ADLS_ACUITY_SCORE: 55
ADLS_ACUITY_SCORE: 51
ADLS_ACUITY_SCORE: 32
ADLS_ACUITY_SCORE: 49
ADLS_ACUITY_SCORE: 51
ADLS_ACUITY_SCORE: 32
ADLS_ACUITY_SCORE: 49
ADLS_ACUITY_SCORE: 51
ADLS_ACUITY_SCORE: 49
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
## 2025-04-30 NOTE — CONSULTS
Stroke Neurology team was consulted overnight after patient had a brief transient episode of confusion and speech difficulty. MRI brain was advised but patient declined completion of MRI today. Per chart review, appears that his stroke-like symptoms resolved, as this may be in the setting of not sleeping well vs medication-related effect (received IV dilaudid d/t hip/R leg pain overnight) vs possible underlying post-op infection/sepsis.     Discussed with Hospitalist that since pt's symptoms resolved and there is low suspicion for cerebrovascular etiologies, will sign off at this time. Please re-consult Stroke team if needed.    MARIE Neal CNP  Vascular Neurology

## 2025-04-30 NOTE — ED NOTES
Attempted to call wife Izabela at [phone] a few times to discuss MRI checklist and spinal cord stimulator.

## 2025-04-30 NOTE — PHARMACY-VANCOMYCIN DOSING SERVICE
Pharmacy Vancomycin Initial Note  Date of Service 2025  Patient's  1955  69 year old, male    Indication: Skin and Soft Tissue Infection    Current estimated CrCl = Estimated Creatinine Clearance: 114.9 mL/min (based on SCr of 0.7 mg/dL).    Creatinine for last 3 days  2025:  8:21 PM Creatinine 0.86 mg/dL  2025:  6:48 AM Creatinine 0.70 mg/dL    Recent Vancomycin Level(s) for last 3 days  No results found for requested labs within last 3 days.      Vancomycin IV Administrations (past 72 hours)        No vancomycin orders with administrations in past 72 hours.                    Nephrotoxins and other renal medications (From now, onward)      Start     Dose/Rate Route Frequency Ordered Stop    25 1400  vancomycin (VANCOCIN) 1,250 mg in 0.9% NaCl 262.5 mL intermittent infusion         1,250 mg  over 90 Minutes Intravenous ONCE 25 1332      25 1400  piperacillin-tazobactam (ZOSYN) 3.375 g vial to attach to  mL bag         3.375 g  over 30 Minutes Intravenous EVERY 6 HOURS 25 1332              Contrast Orders - past 72 hours (72h ago, onward)      Start     Dose/Rate Route Frequency Stop    25 1100  iopamidol (ISOVUE-370) solution 90 mL         90 mL Intravenous ONCE 25 1039    25 2230  iopamidol (ISOVUE-370) solution 75 mL         75 mL Intravenous ONCE 25 2321            [employer] Prediction of Planned Initial Vancomycin Regimen  Loading dose: 1750 mg at 14:00 2025.  Regimen: 1000 mg IV every 12 hours. Dose start time: 22:00 on 2025  Exposure target: AUC24 (range)400-600 mg/L.hr   AUC24,ss: 458 mg/L.hr  Probability of AUC24 > 400: 64 %  Ctrough,ss: 14.2 mg/L  Probability of Ctrough,ss > 20: 23 %  Probability of nephrotoxicity (Lodise GABRIELLA ): 9 %          Plan:  Start vancomycin  1750mg iv x1 load now then 1000mg iv q12h starting at 2200 2025    Vancomycin monitoring method: AUC  Vancomycin therapeutic monitoring  goal: 400-600 mg*h/L  Pharmacy will check vancomycin levels as appropriate in 1-3 Days.    Serum creatinine levels will be ordered daily for the first week of therapy and at least twice weekly for subsequent weeks.      Russell Barrett RPH

## 2025-04-30 NOTE — PROGRESS NOTES
[facility] MEDICINE  PROGRESS NOTE       Securely message me with Dora (more info)    Code Status: Full Code       Identification/Summary:       1. Fever   Possible source is postop infection, acute cystitis; other consideration is clots, atelectasis  - Rocephin on admission > Vanc and Zosyn  - procalcitonin 0.17, CRP 99.2     2. Stroke like symptoms - Noted while in ED, with confusion and difficulty getting words out. Of note, dry very tongue, and he grabbed his bottle water and drink with no issues. Around that time, he got 0.5 mgs IV dilaudid. Difficulty moving right LE due to pain and swelling from hip to right leg      - Currently, interactive, and talkative.   - follow head CT - non-revealing  - stroke consult recommended MRI but patient refused it  - keep monitoring     3. Right LE swelling - with recent s/p right STORM  - No DVT      4. S/p R hip arthroplasty  CT showed some fluid collection, edema/cellulitis. Orthopedic surgery is on board.   Continue vancomycin and Zosyn     Chronic Medical Conditions     1. Urinary retention, previously s/p nation insertion and removal  2. Blood loss anemia - 11.7 (pre-op Hgb 13.8) .    3. HTN and HLD  4. RLS  5. Depression  6. Chronic low back pain, s/p spinal cord stimulator  7. Fibromyalgia        Anticoagulation   Aspirin bid      Therapy: PT, OT  Nation:Not present  Lines: None       Current Diet  Orders Placed This Encounter      Low Fat Diet (up to 50g)        Clinically Significant Risk Factors Present on Admission         # Hyponatremia: Lowest Na = 134 mmol/L in last 2 days, will monitor as appropriate       # Hypoalbuminemia: Lowest albumin = 3.4 g/dL at 4/30/2025  6:48 AM, will monitor as appropriate   # Drug Induced Platelet Defect: home medication list includes an antiplatelet medication   # Hypertension: Noted on problem list      # Anemia: based on hgb <11       # Overweight: Estimated body mass index is 27.59 kg/m  as  "calculated from the following:    Height as of this encounter: 1.778 m (5' 10\").    Weight as of this encounter: 87.2 kg (192 lb 4.8 oz).              Interval History/Subjective:  Patient reports pain in R hip. Weakness both legs are not new today. No more dysphasia.       Last 24H PRN:     acetaminophen (TYLENOL) tablet 650 mg, 650 mg at 04/30/25 1211 **OR** acetaminophen (TYLENOL) Suppository 650 mg    HYDROcodone-acetaminophen (NORCO) 5-325 MG per tablet 1 tablet, 1 tablet at 04/30/25 0946    senna-docusate (SENOKOT-S/PERICOLACE) 8.6-50 MG per tablet 1 tablet, 1 tablet at 04/30/25 0756 **OR** senna-docusate (SENOKOT-S/PERICOLACE) 8.6-50 MG per tablet 2 tablet    Physical Exam/Objective:  Temp:  [97.6  F (36.4  C)-98.5  F (36.9  C)] 97.6  F (36.4  C)  Pulse:  [] 72  Resp:  [13-31] 18  BP: (133-178)/(65-94) 151/75  SpO2:  [92 %-97 %] 95 %  Wt Readings from Last 4 Encounters:   04/30/25 87.2 kg (192 lb 4.8 oz)   04/24/25 83.9 kg (185 lb)   04/22/25 83.9 kg (185 lb)     Body mass index is 27.59 kg/m .    General Appearance: Alert and wake, not in distress  Respiratory: clear lungs, no crackles or wheezing  Cardiovascular: rhythmic, normal S1 and S2, no murmur  GI: soft, non-tender, normal bowel sound  Neurology: oriented x 3, CN2-12 grossly intact, strength 5/5 throughout upper, LL calf numbness and L foot numbness, chronic. LLL hip flexion/extension, knee flexion/extension, R hip flexion/extension strength 4/5, sensation grossly intact in all extremities except LLL calf  Psych: cooperative and calm, normal affect    Medications:   Personally Reviewed.  Medications   Current Facility-Administered Medications   Medication Dose Route Frequency Provider Last Rate Last Admin    sodium chloride 0.9 % infusion   Intravenous Continuous Cindy Hall  mL/hr at 04/30/25 1028 Rate Verify at 04/30/25 1028     Current Facility-Administered Medications   Medication Dose Route Frequency Provider Last Rate Last " Admin    [Held by provider] amLODIPine (NORVASC) tablet 5 mg  5 mg Oral Daily Cindy Hall MD        aspirin EC tablet 81 mg  81 mg Oral BID Cindy Hall MD   81 mg at 04/30/25 0756    piperacillin-tazobactam (ZOSYN) 3.375 g vial to attach to  mL bag  3.375 g Intravenous Q6H Sandra Pate MD   3.375 g at 04/30/25 1412    polyethylene glycol (MIRALAX) Packet 17 g  17 g Oral QAM Cindy Hall MD   17 g at 04/30/25 0755    [Held by provider] pregabalin (LYRICA) capsule 50 mg  50 mg Oral BID Cindy Hall MD        rosuvastatin (CRESTOR) tablet 40 mg  40 mg Oral At Bedtime Cindy Hall MD   40 mg at 04/30/25 0251    senna-docusate (SENOKOT-S/PERICOLACE) 8.6-50 MG per tablet 1 tablet  1 tablet Oral BID Cindy Hall MD        [Held by provider] sertraline (ZOLOFT) tablet 100 mg  100 mg Oral Daily Cindy Hall MD        sodium chloride (PF) 0.9% PF flush 3 mL  3 mL Intracatheter Q8H EVETTE Cindy Hall MD        tamsulosin (FLOMAX) capsule 0.8 mg  0.8 mg Oral QPM Cindy Hall MD        vancomycin (VANCOCIN) 1,000 mg in NaCl 0.9% 200 mL intermittent infusion  1,000 mg Intravenous Q12H Sandra Pate MD        vancomycin (VANCOCIN) 1,750 mg in 0.9% NaCl 517.5 mL intermittent infusion  1,750 mg Intravenous Once Sandra Pate MD        Vitamin D3 (CHOLECALCIFEROL) tablet 25 mcg  25 mcg Oral Daily Cindy Hall MD   25 mcg at 04/30/25 0756       Data reviewed today: I personally reviewed all new medications, labs, imaging/diagnostics reports over the past 24 hours. Pertinent findings include:    Imaging:   Recent Results (from the past 24 hours)   CTA Head Neck with Contrast    Narrative    EXAM: CTA HEAD NECK W CONTRAST  LOCATION: [facility]  DATE/TIME: 4/29/2025 11:18 PM CDT    INDICATION:  Altered mental status possible word finding problems  COMPARISON:  MRI brain 1/4/2024.  CONTRAST: Isovue 370 75 mL  TECHNIQUE: Head and neck CT angiogram  with IV contrast. Noncontrast head CT followed by axial helical CT images of the head and neck vessels obtained during the arterial phase of intravenous contrast administration. Axial 2D reconstructed images and   multiplanar 3D MIP reconstructed images of the head and neck vessels were performed by the technologist. Dose reduction techniques were used. All stenosis measurements made according to NASCET criteria unless otherwise specified.    FINDINGS:   NONCONTRAST HEAD CT:   INTRACRANIAL CONTENTS: No intracranial hemorrhage, extraaxial collection, or mass effect.  No CT evidence of acute infarct. Normal parenchymal attenuation. Normal ventricles and sulci.     VISUALIZED ORBITS/SINUSES/MASTOIDS: No intraorbital abnormality. No significant paranasal sinus mucosal disease. No middle ear or mastoid effusion.    BONES/SOFT TISSUES: No acute abnormality. Slight sclerosis corresponding to the enhancing right parietal calvarial lesion on the 1/4/2024 brain MRI. This is without aggressive features.    HEAD CTA:  ANTERIOR CIRCULATION: No stenosis/occlusion, aneurysm, or high flow vascular malformation. Standard [place] anatomy.    POSTERIOR CIRCULATION: No stenosis/occlusion, aneurysm, or high flow vascular malformation. Dominant right and smaller left vertebral artery contribute to a normal basilar artery.     DURAL VENOUS SINUSES: Expected enhancement of the major dural venous sinuses.    NECK CTA:  RIGHT CAROTID: No measurable stenosis or dissection.    LEFT CAROTID: No measurable stenosis or dissection.    VERTEBRAL ARTERIES: No focal stenosis or dissection. Dominant right and smaller left vertebral arteries.    AORTIC ARCH: Vascular variant aortic arch origin left vertebral artery.  No significant stenosis at the origin of the great vessels.    NONVASCULAR STRUCTURES:  Small right thyroid nodule, not meeting size criteria for follow-up. Multilevel cervical spondylosis.      Impression    IMPRESSION:   HEAD  CT:  No acute intracranial process.    HEAD CTA:  No large vessel occlusion or hemodynamically significant stenosis.    NECK CTA:  No large vessel occlusion or hemodynamically significant stenosis.     US Lower Extremity Venous Duplex Right    Narrative    EXAM: US LOWER EXTREMITY VENOUS DUPLEX RIGHT  LOCATION: [facility]  DATE: 4/29/2025    INDICATION: leg pain, hip replacement  COMPARISON: None.  TECHNIQUE: Venous Duplex ultrasound of the right lower extremity with and without compression, augmentation and duplex. Color flow and spectral Doppler with waveform analysis performed.    FINDINGS: Exam includes the common femoral, femoral, popliteal, and contralateral common femoral veins as well as segmentally visualized deep calf veins and greater saphenous vein.     RIGHT: No deep vein thrombosis. No superficial thrombophlebitis. No popliteal cyst.      Impression    IMPRESSION:  1.  No deep venous thrombosis in the right lower extremity.   CT Hip Right w Contrast    Narrative    EXAM: CT HIP RIGHT W CONTRAST  LOCATION: [facility]  DATE: 4/30/2025    INDICATION: S/p 2 weeks STORM now with fever and increased hip pain r/o infection or abscess.  COMPARISON: None.  TECHNIQUE: Without and with IV contrast. Axial, sagittal and coronal thin-section reconstruction. Dose reduction techniques were used.   CONTRAST: Isovue 370, 90 mL.    FINDINGS:     -Postoperative changes right total hip arthroplasty. The components appear well seated. There is a lucent lesion within the proximal right femur anterior to the femoral endoprosthesis which extends to the superficial bone margin identified on series 3,   image 75. This is associated with an area of peripherally calcified fluid density anterior to the proximal femur which may represent early developing myositis ossificans but it would be difficult to exclude superimposed infection of the presumed subacute   hematoma. This is seen  on series 3, image 86 and partially visualized on series 8, image 122. Because this is immediately adjacent to the bone lucency, an infectious process cannot be excluded. There is still a tiny amount of gas cephalad to this region   just deep to the proximal quadriceps musculature on series 3, image 51.    The remainder of the right hemipelvis is negative for fracture or focal bone lesion. Degenerative change at the symphysis pubis. There is some nonspecific edema or cellulitis lateral to the proximal thigh with a separate tiny fluid collection along the   incision line laterally on series 3, image 78. This measures 1.7 x 1.8 x 1.5 cm.      Impression    IMPRESSION:  1.  Postoperative changes of total hip arthroplasty. Components appear well seated.  2.  There is a focal area of lucency within the anterior aspect of the proximal right femur adjacent to the femoral endoprosthesis. This extends to the superficial bone margin where there is cortical irregularity.  3.  In addition, peripheral calcification extends from this region and encircles a fluid collection which may represent early developing myositis ossificans but superimposed infection cannot be excluded. This is fairly fusiform in morphology and well   seen on sagittal series 8, image 123 and partially visualized on series 3, image 86.  4.  There is a tiny focus of gas which persists anterior to the proximal femur, deep to the proximal quadriceps musculature. This is abnormal for this phase of postoperative chronicity and also worrisome for infection.  5.  There is a separate tiny fluid collection along the incision line lateral to the right hip which measures 1.7 x 1.8 x 1.5 cm. This is not specific for abscess but this cannot be excluded.  6.  There is some edema or cellulitis lateral to the right hip.  7.  Intrapelvic contents are negative where seen.         Labs:  CT Hip Right w Contrast   Final Result   IMPRESSION:   1.  Postoperative changes of total  hip arthroplasty. Components appear well seated.   2.  There is a focal area of lucency within the anterior aspect of the proximal right femur adjacent to the femoral endoprosthesis. This extends to the superficial bone margin where there is cortical irregularity.   3.  In addition, peripheral calcification extends from this region and encircles a fluid collection which may represent early developing myositis ossificans but superimposed infection cannot be excluded. This is fairly fusiform in morphology and well    seen on sagittal series 8, image 123 and partially visualized on series 3, image 86.   4.  There is a tiny focus of gas which persists anterior to the proximal femur, deep to the proximal quadriceps musculature. This is abnormal for this phase of postoperative chronicity and also worrisome for infection.   5.  There is a separate tiny fluid collection along the incision line lateral to the right hip which measures 1.7 x 1.8 x 1.5 cm. This is not specific for abscess but this cannot be excluded.   6.  There is some edema or cellulitis lateral to the right hip.   7.  Intrapelvic contents are negative where seen.         US Lower Extremity Venous Duplex Right   Final Result   IMPRESSION:   1.  No deep venous thrombosis in the right lower extremity.      CTA Head Neck with Contrast   Final Result   IMPRESSION:    HEAD CT:   No acute intracranial process.      HEAD CTA:   No large vessel occlusion or hemodynamically significant stenosis.      NECK CTA:   No large vessel occlusion or hemodynamically significant stenosis.         MR Brain w/o Contrast    (Results Pending)     Recent Results (from the past 24 hours)   UA with Microscopic reflex to Culture    Collection Time: 04/29/25  8:11 PM    Specimen: Urine, Midstream   Result Value Ref Range    Color Urine Light Yellow Colorless, Straw, Light Yellow, Yellow    Appearance Urine Clear Clear    Glucose Urine Negative Negative mg/dL    Bilirubin Urine Negative  Negative    Ketones Urine Negative Negative mg/dL    Specific Gravity Urine 1.018 1.001 - 1.030    Blood Urine 0.1 mg/dL (A) Negative    pH Urine 8.0 (H) 5.0 - 7.0    Protein Albumin Urine Negative Negative mg/dL    Urobilinogen Urine 8.0 (A) Normal mg/dL    Nitrite Urine Negative Negative    Leukocyte Esterase Urine 25 Joseph/uL (A) Negative    RBC Urine 31 (H) <=2 /HPF    WBC Urine 32 (H) <=5 /HPF   Urine Culture    Collection Time: 04/29/25  8:11 PM    Specimen: Urine, Midstream   Result Value Ref Range    Culture 10,000-50,000 CFU/mL Pseudomonas aeruginosa (A)    Comprehensive metabolic panel    Collection Time: 04/29/25  8:21 PM   Result Value Ref Range    Sodium 134 (L) 135 - 145 mmol/L    Potassium 4.0 3.4 - 5.3 mmol/L    Carbon Dioxide (CO2) 24 22 - 29 mmol/L    Anion Gap 12 7 - 15 mmol/L    Urea Nitrogen 12.9 8.0 - 23.0 mg/dL    Creatinine 0.86 0.67 - 1.17 mg/dL    GFR Estimate >90 >60 mL/min/1.73m2    Calcium 9.7 8.8 - 10.4 mg/dL    Chloride 98 98 - 107 mmol/L    Glucose 104 (H) 70 - 99 mg/dL    Alkaline Phosphatase 74 40 - 150 U/L    AST 81 (H) 0 - 45 U/L    ALT 69 0 - 70 U/L    Protein Total 6.9 6.4 - 8.3 g/dL    Albumin 4.1 3.5 - 5.2 g/dL    Bilirubin Total 0.5 <=1.2 mg/dL   Lactic acid whole blood    Collection Time: 04/29/25  8:21 PM   Result Value Ref Range    Lactic Acid 1.3 0.7 - 2.0 mmol/L   Blood Culture Peripheral Blood    Collection Time: 04/29/25  8:21 PM    Specimen: Peripheral Blood   Result Value Ref Range    Culture No growth after 12 hours    CBC with platelets and differential    Collection Time: 04/29/25  8:21 PM   Result Value Ref Range    WBC Count 11.8 (H) 4.0 - 11.0 10e3/uL    RBC Count 3.80 (L) 4.40 - 5.90 10e6/uL    Hemoglobin 11.7 (L) 13.3 - 17.7 g/dL    Hematocrit 33.8 (L) 40.0 - 53.0 %    MCV 89 78 - 100 fL    MCH 30.8 26.5 - 33.0 pg    MCHC 34.6 31.5 - 36.5 g/dL    RDW 13.4 10.0 - 15.0 %    Platelet Count 264 150 - 450 10e3/uL    % Neutrophils 91 %    % Lymphocytes 4 %    %  Monocytes 4 %    % Eosinophils 1 %    % Basophils 0 %    % Immature Granulocytes 0 %    NRBCs per 100 WBC 0 <1 /100    Absolute Neutrophils 10.7 (H) 1.6 - 8.3 10e3/uL    Absolute Lymphocytes 0.4 (L) 0.8 - 5.3 10e3/uL    Absolute Monocytes 0.5 0.0 - 1.3 10e3/uL    Absolute Eosinophils 0.1 0.0 - 0.7 10e3/uL    Absolute Basophils 0.0 0.0 - 0.2 10e3/uL    Absolute Immature Granulocytes 0.0 <=0.4 10e3/uL    Absolute NRBCs 0.0 10e3/uL   Extra Blue Top Tube    Collection Time: 04/29/25  8:21 PM   Result Value Ref Range    Hold Specimen JIC    Procalcitonin    Collection Time: 04/29/25  8:21 PM   Result Value Ref Range    Procalcitonin 0.17 <0.50 ng/mL   Troponin T, High Sensitivity    Collection Time: 04/29/25  8:21 PM   Result Value Ref Range    Troponin T, High Sensitivity 19 <=22 ng/L   Blood Culture Arm, Right    Collection Time: 04/29/25  8:56 PM    Specimen: Arm, Right; Blood   Result Value Ref Range    Culture No growth after 12 hours    Influenza A/B, RSV and SARS-CoV2 PCR (COVID-19) Nasopharyngeal    Collection Time: 04/30/25  5:48 AM    Specimen: Nasopharyngeal; Swab   Result Value Ref Range    Influenza A PCR Negative Negative    Influenza B PCR Negative Negative    RSV PCR Negative Negative    SARS CoV2 PCR Negative Negative   Comprehensive metabolic panel    Collection Time: 04/30/25  6:48 AM   Result Value Ref Range    Sodium 136 135 - 145 mmol/L    Potassium 3.6 3.4 - 5.3 mmol/L    Carbon Dioxide (CO2) 22 22 - 29 mmol/L    Anion Gap 11 7 - 15 mmol/L    Urea Nitrogen 9.5 8.0 - 23.0 mg/dL    Creatinine 0.70 0.67 - 1.17 mg/dL    GFR Estimate >90 >60 mL/min/1.73m2    Calcium 9.1 8.8 - 10.4 mg/dL    Chloride 103 98 - 107 mmol/L    Glucose 118 (H) 70 - 99 mg/dL    Alkaline Phosphatase 62 40 - 150 U/L    AST 58 (H) 0 - 45 U/L    ALT 59 0 - 70 U/L    Protein Total 5.6 (L) 6.4 - 8.3 g/dL    Albumin 3.4 (L) 3.5 - 5.2 g/dL    Bilirubin Total 0.5 <=1.2 mg/dL   Magnesium    Collection Time: 04/30/25  6:48 AM   Result  Value Ref Range    Magnesium 2.1 1.7 - 2.3 mg/dL   CBC with platelets and differential    Collection Time: 04/30/25  6:48 AM   Result Value Ref Range    WBC Count 9.5 4.0 - 11.0 10e3/uL    RBC Count 3.33 (L) 4.40 - 5.90 10e6/uL    Hemoglobin 10.3 (L) 13.3 - 17.7 g/dL    Hematocrit 29.2 (L) 40.0 - 53.0 %    MCV 88 78 - 100 fL    MCH 30.9 26.5 - 33.0 pg    MCHC 35.3 31.5 - 36.5 g/dL    RDW 13.4 10.0 - 15.0 %    Platelet Count 202 150 - 450 10e3/uL    % Neutrophils 91 %    % Lymphocytes 3 %    % Monocytes 5 %    % Eosinophils 0 %    % Basophils 0 %    % Immature Granulocytes 1 %    NRBCs per 100 WBC 0 <1 /100    Absolute Neutrophils 8.7 (H) 1.6 - 8.3 10e3/uL    Absolute Lymphocytes 0.3 (L) 0.8 - 5.3 10e3/uL    Absolute Monocytes 0.5 0.0 - 1.3 10e3/uL    Absolute Eosinophils 0.0 0.0 - 0.7 10e3/uL    Absolute Basophils 0.0 0.0 - 0.2 10e3/uL    Absolute Immature Granulocytes 0.1 <=0.4 10e3/uL    Absolute NRBCs 0.0 10e3/uL   CRP inflammation    Collection Time: 04/30/25  6:48 AM   Result Value Ref Range    CRP Inflammation 99.20 (H) <5.00 mg/L       Pending Labs:  Unresulted Labs Ordered in the Past 30 Days of this Admission       Date and Time Order Name Status Description    4/29/2025  8:23 PM Urine Culture Preliminary     4/29/2025  8:00 PM Blood Culture Peripheral Blood Preliminary     4/29/2025  8:00 PM Blood Culture Arm, Right Preliminary             I spoke with wife to discuss patient's care.  I spoke with Tamika JAMISON with ortho to discuss patient's care.  I spent 60min in patient's care today.    JINNY BELTRAN MD  [facility]  Phone: #[phone]    Securely message me with Dora (more info)

## 2025-04-30 NOTE — CONSULTS
"  [facility]    Stroke Telephone Note    I was called by Dr. Cindy Hall on 04/30/25 regarding patient Devang English.     The patient is a 69 year old man with h/o HTN and recent right total hip replacement (4/22), who has been admitted for management of sepsis likely from UTI. CT, CTA was done at the ED for concern of brief episode of confusion and speech difficulty which was considered to be likely encephalopathy based on ED provider's clinical judgement. Admitting provider, Dr. Hall, touched based with me regarding any further management needed. Exam at bedside by Dr. Hall when the patient came back from CT, CTA, showed no focal deficits and patient was very interactive and talkative and did not appear confused but tongue seemed very dry.     Vitals  BP: (!) 151/75   Pulse: 98   Resp: 18   Temp: 97.8  F (36.6  C)   Weight: 83.9 kg (185 lb)    Imaging Findings  CT head: No acute ischemia/hemorrhage noted  CTA head/neck: No LVO/critical stenoses    Impression  Speech difficulty/ Confusion  Sepsis  Dry tongue  Recent R total hip replacement    Recommendations  Given recent history of R total hip replacement and possibility of reduced mobility secondary to the same, would advise MRI Brain w/o contrast to rule out any evidence of acute stroke.    My recommendations are based on the information provided over the phone by Devang English's in-person providers. They are not intended to replace the clinical judgment of his in-person providers. I was not requested to personally see or examine the patient at this time.     Kelton Ramos MD  Vascular Neurology    To page me or covering stroke neurology team member, click here: AMCOM  Choose \"On Call\" tab at top, then select \"NEUROLOGY/ALL SITES\" from middle drop-down box, press Enter, then look for \"stroke\" or \"telestroke\" for your site.   "

## 2025-04-30 NOTE — ED NOTES
RN completed MRI checklist with patient and it is noted that patient has a spinal cord stimulator. According to patient the device to turn it off is at home. RN and patient tried calling wife numerous times to see if she can verify the MRI checklist and bring in the device. Patient states he cannot have an MRI unless that spinal stimulator is turned off with the device at home. MD was updated

## 2025-04-30 NOTE — PLAN OF CARE
Problem: Adult Inpatient Plan of Care  Goal: Plan of Care Review  Outcome: Progressing     Patient is A/Ox4, VSS on RA. SBA with walker and gait belt when ambulating. Voiding adequately. Dressing has a small amount of drainage but not extended. Full sensation per Pt. IV infusing IV ABX, patent. No new skin issues noted. Patient rating pain 3/10 during shift, cold therapy, pain medications, rest, and repositioning used to manage pain.

## 2025-04-30 NOTE — CONSULTS
ORTHOPEDIC CONSULTATION    Consultation  Devang English,  1955, MRN [id number]    [facility]   Urinary tract infection without hematuria, site unspecified [N39.0]  Confusion [R41.0]  Hip pain, right [M25.551]    PCP: Lise Anderson, [phone]   Code status:  Full Code       No emergency contact information on file.         IMPRESSION:  S/p Right STORM on  now with fever and UTI, initial encouner     PLAN:  This patient was discussed with Dr. Dee, on-call surgeon for [facility] and they are in agreement with the following plan.     - CT right hip to look for joint effusion or abscess  - if CT positive for joint effusion will order aspiration if not likely not infected hip  - UTI cares per hospitalist  - pain control if pain remains refractory would recommend pain consult  - pain has increased in the hip and ROM has also decreased so I am concerned for hip infection and want to be cautious. If the CT is inconclusive will order MRI of the hip as well.       ADDENDUM:  - Robert reviewed the CT and acutely there is nothing to do at this time  - Can eat  - Treat UTI  - Will follow while he is here for any wound drainage    Thank you for including [facility] in the care of Devang English. It has been a pleasure participating in Devang care.        CHIEF COMPLAINT: <principal problem not specified>    HISTORY OF PRESENT ILLNESS:  The patient is seen in orthopedic consultation at the request of .  The patient is a 69 year old male with severe pain of the right  hip. The patient reports that he had a total hip replacement 1 week ago at [facility] by Dr. Jones. Patient was attempted to get a straight cath. Had difficulty so got a nation catheter instead. Patient needed to go back to the ED because his catheter was plugged. They changed it into a bigger catheter. Patient saw [facility] today and tried voiding. This was successful.Patient endorses pain and some  blood. Patient has been urinating since then. Patient also notes that the right leg is swollen, but has not worsen since after the surgery. Patient's right hip is also a little red and swollen. Patient took Dilaudid around 5 PM today.     Patient also had a temperature of 103 yeasterday. Patient endorses fevers, chills, body aches, fatigue, and he states he is feeling a little cloudy.      Patient denies abdominal pain, cold symptoms, cough, or any other complaints at this time.    PAST MEDICAL HISTORY:  Past Medical History:   Diagnosis Date    Arthritis     Hypertension           ALLERGIES:   Review of patient's allergies indicates No Known Allergies      MEDICATIONS UPON ADMISSION:  Medications were reviewed.  They include:   (Not in a hospital admission)        SOCIAL HISTORY:   he  reports that he has never smoked. He has never used smokeless tobacco. He reports current alcohol use. He reports that he does not currently use drugs.    FAMILY HISTORY:  family history is not on file.      REVIEW OF SYSTEMS:   Reviewed with patient. See HPI, otherwise negative       PHYSICAL EXAMINATION:  Vitals: Temp:  [97.8  F (36.6  C)-98.5  F (36.9  C)] 98.5  F (36.9  C)  Pulse:  [] 90  Resp:  [17-31] 20  BP: (133-178)/(65-94) 148/82  SpO2:  [92 %-96 %] 95 %  General: On examination, the patient is resting comfortably, NAD, awake, and alert and oriented to person, place, time, and, and general circumstances   SKIN: There is no evidence of erythema, or drainage from the operative incision there is extensive swelling over the right hip.   Pulses:  dorsalis pedis and posterior tibial pulse is intact and equal bilaterally  Sensation: intact and equal bilaterally to the distal lower extremities.  Tenderness: tenderness to palpation over the anterior hip and over the incision  ROM: able to plantar and dorsiflex the ankle. Unable to bend knee and straight leg raise due to pain in the hip  Motor: 5/5  Contralateral side= Full  "range of motion, Negative joint instability findings, 5/5 motor groups about the joint, Non-tender.       RADIOGRAPHIC EVALUATION:  Personally reviewed    PERTINENT LABS:  Lab Results: personally reviewed.   @BRIEFLAB[NA,K,CL,CO2,BUN,CREATININE,GLUCOSE,GLUCOSE @  Lab Results   Component Value Date    WBC 9.5 04/30/2025    HGB 10.3 04/30/2025    HCT 29.2 04/30/2025    MCV 88 04/30/2025     04/30/2025       Clinically Significant Risk Factors Present on Admission      # Drug Induced Platelet Defect: home medication list includes an antiplatelet medication    # Overweight: Estimated body mass index is 26.54 kg/m  as calculated from the following:    Height as of 4/24/25: 1.778 m (5' 10\").    Weight as of this encounter: 83.9 kg (185 lb).       Risk Factors for Delirium:       #Cognitive impairment:  Noted on problem list     Tamika Li PA-C, JOSE ALBERTO  Date: 4/30/2025  Time: 10:28 AM    CC1:   Sandra Pate MD    CC2:   Lise Anderson   "

## 2025-04-30 NOTE — PLAN OF CARE
Pt receiving IV antibiotics UA positive. Q4 Neuro checks next one @ 1938. Pt voiding using urinal. K and Mg protocol both rechecks in the AM 5/1. MRSA pending. SBA.   Gaby Torre RN on 4/30/2025 at 5:38 PM   Problem: Infection  Goal: Absence of Infection Signs and Symptoms  Outcome: Progressing   Goal Outcome Evaluation:
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
## 2025-04-30 NOTE — PROGRESS NOTES
[facility] ED Handoff Report    ED Chief Complaint: right hip pain, fever, penile pain and bleeding    ED Diagnosis:  (N39.0) Urinary tract infection without hematuria, site unspecified  Comment: Iv abx - uses bedside urinal  Plan: continue abx and fluids    (R41.0) Confusion  Comment: resolved- currently does not want MRI performed, CT of head unsuspicious of neurological casues for episode of confusion reported.   Plan: MD to address. Paged and waiting rounds.     (M20.811) Hip pain, right  Comment: post op right hip replacement x2weeks ago.   Plan: ortho consult.  Prn norco q4h last given at 10am.  Ortho sent ordered ct of hip to assess for fluid or possible infection- if so will drain if not assumption of fever due to UTI       PMH:    Past Medical History:   Diagnosis Date    Arthritis     Hypertension         Code Status:  Full Code     Falls Risk: Yes Band: Applied    Current Living Situation/Residence: lives with a significant other     Elimination Status: Continent: Yes - retention issues post op     Activity Level: SBA w/ walker    Patient's Preferred Language:  English     Needed: No    Vital Signs:  BP (!) 148/78 (BP Location: Right arm)   Pulse 91   Temp 98.5  F (36.9  C) (Oral)   Resp 17   Wt 83.9 kg (185 lb)   SpO2 94%   BMI 26.54 kg/m       Cardiac Rhythm: SR    Pain Score: 3/10    Is the Patient Confused:  No - currently not confused but wife here in the night and stated he seemed confused, couldn't answer where he was and having some word finding difficulty. CT - pending possible MRI. Wife called for whatever contraption she had that will be able to davina off his internal spinal stimulator. Pt states he does not want MRI or spine stim turned off and that he does get confused at times when he is not sleeping well and In pain, bot cases true for him last night. Remainson q4h neuro checks currently, md to address at rounds plan of care. Oriented x4 currently. Wife also states he  does at times get confused when he is tired.     Last Food or Drink: 4/29/25 at 0000 (Will awaiting neuro game plan).    Assessment and Plan of Care:  Patent comes in and has right hip pain post op x 2 weeks from hip repair. Also retaining urine at needing straight cath. Now using bedside urinal - attempted to straight cath at home and states he injured his penile area and was bleeding quite a bit. Pos for uti and being treated with doxy oral.   Episode of confusion post dilaudid admin in the night- Sent for CT and pending possible MRI (see above info about clearing of mentation and spinal stim).     Tests Performed: Done: Labs and Imaging    Treatments Provided:  straight cath, abx, tele , fluids    Family Dynamics/Concerns: No    Belongings Checklist Done and Signed by Patient: N/A    Belongings Sent with Patient: clothing and cell phone remain with pt    Boarding medications sent with patient: none    K and Mag protocols- recheck in AM    1328: Pt's IVF ran out - will need new Bag of NS started at 100 ml/hr once on 3E.       RN: Radha Mattson RN     4/30/2025 8:58 AM
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
## 2025-04-30 NOTE — ED PROVIDER NOTES
EMERGENCY DEPARTMENT SIGN OUT NOTE        ED COURSE AND MEDICAL DECISION MAKING  Patient was signed out to me by Dr Attila Steel at 11:35 PM     In brief, Devang English is a 69 year old male who initially presented leg swelling and URI symptoms including urinary retention. Had Calhoun removed today. Patient one week postop from total hip. Took Dilaudid at 5 PM. Fever of 103 today.    At time of sign out, disposition was pending CTA of the head.  CT of the head neck did not show any acute occlusion, stroke or blockage.  Patient reexamined and does not seem to have any difficulty with word finding he is able to answer all my questions appropriately.  Strength in upper extremities equal bilaterally and coordination intact with no facial droop.  Right lower extremity does have some difficulty with movement but he just recently had right hip surgery which likely some other movement limitation is from pain.  Patient does report feeling better since being here and will continue with plan as previously discussed for admission for urinary tract infection and likely suspect encephalopathy related to that.    FINAL IMPRESSION    1. Urinary tract infection without hematuria, site unspecified    2. Confusion    3. Hip pain, right      ED MEDS  Medications   amLODIPine (NORVASC) tablet 5 mg (has no administration in time range)   aspirin EC tablet 81 mg (has no administration in time range)   polyethylene glycol (MIRALAX) Packet 17 g (has no administration in time range)   pramipexole (MIRAPEX) tablet 0.25-0.5 mg (has no administration in time range)   pregabalin (LYRICA) capsule 50 mg (has no administration in time range)   rosuvastatin (CRESTOR) tablet 40 mg (has no administration in time range)   senna-docusate (SENOKOT-S/PERICOLACE) 8.6-50 MG per tablet 1 tablet (has no administration in time range)   sertraline (ZOLOFT) tablet 100 mg (has no administration in time range)   Vitamin D3 (CHOLECALCIFEROL) tablet 25 mcg (has no  administration in time range)   tamsulosin (FLOMAX) capsule 0.8 mg (has no administration in time range)   lidocaine 1 % 0.1-1 mL (has no administration in time range)   lidocaine (LMX4) cream (has no administration in time range)   sodium chloride (PF) 0.9% PF flush 3 mL (has no administration in time range)   sodium chloride (PF) 0.9% PF flush 3 mL (has no administration in time range)   senna-docusate (SENOKOT-S/PERICOLACE) 8.6-50 MG per tablet 1 tablet (has no administration in time range)     Or   senna-docusate (SENOKOT-S/PERICOLACE) 8.6-50 MG per tablet 2 tablet (has no administration in time range)   calcium carbonate (TUMS) chewable tablet 1,000 mg (has no administration in time range)   sodium chloride 0.9 % infusion (has no administration in time range)   acetaminophen (TYLENOL) tablet 650 mg (has no administration in time range)     Or   acetaminophen (TYLENOL) Suppository 650 mg (has no administration in time range)   ondansetron (ZOFRAN ODT) ODT tab 4 mg (has no administration in time range)     Or   ondansetron (ZOFRAN) injection 4 mg (has no administration in time range)   prochlorperazine (COMPAZINE) injection 5 mg (has no administration in time range)     Or   prochlorperazine (COMPAZINE) tablet 5 mg (has no administration in time range)   cefTRIAXone (ROCEPHIN) 2 g vial to attach to  ml bag for ADULTS or NS 50 ml bag for PEDS (has no administration in time range)   HYDROmorphone (DILAUDID) injection 0.2 mg (has no administration in time range)   HYDROcodone-acetaminophen (NORCO) 5-325 MG per tablet 1 tablet (has no administration in time range)   sodium chloride 0.9% BOLUS 1,000 mL (0 mLs Intravenous Stopped 4/30/25 0117)   HYDROmorphone (PF) (DILAUDID) injection 0.5 mg (0.5 mg Intravenous $Given 4/29/25 2100)   cefTRIAXone (ROCEPHIN) 2 g vial to attach to  ml bag for ADULTS or NS 50 ml bag for PEDS (0 g Intravenous Stopped 4/29/25 2306)   sodium chloride 0.9% BOLUS 1,000 mL (0 mLs  Intravenous Stopped 4/30/25 0122)   iopamidol (ISOVUE-370) solution 75 mL (75 mLs Intravenous $Given 4/29/25 1424)   acetaminophen (TYLENOL) tablet 650 mg (650 mg Oral $Given 4/30/25 0120)       LAB  Labs Ordered and Resulted from Time of ED Arrival to Time of ED Departure   COMPREHENSIVE METABOLIC PANEL - Abnormal       Result Value    Sodium 134 (*)     Potassium 4.0      Carbon Dioxide (CO2) 24      Anion Gap 12      Urea Nitrogen 12.9      Creatinine 0.86      GFR Estimate >90      Calcium 9.7      Chloride 98      Glucose 104 (*)     Alkaline Phosphatase 74      AST 81 (*)     ALT 69      Protein Total 6.9      Albumin 4.1      Bilirubin Total 0.5     ROUTINE UA WITH MICROSCOPIC REFLEX TO CULTURE - Abnormal    Color Urine Light Yellow      Appearance Urine Clear      Glucose Urine Negative      Bilirubin Urine Negative      Ketones Urine Negative      Specific Gravity Urine 1.018      Blood Urine 0.1 mg/dL (*)     pH Urine 8.0 (*)     Protein Albumin Urine Negative      Urobilinogen Urine 8.0 (*)     Nitrite Urine Negative      Leukocyte Esterase Urine 25 Joseph/uL (*)     RBC Urine 31 (*)     WBC Urine 32 (*)    CBC WITH PLATELETS AND DIFFERENTIAL - Abnormal    WBC Count 11.8 (*)     RBC Count 3.80 (*)     Hemoglobin 11.7 (*)     Hematocrit 33.8 (*)     MCV 89      MCH 30.8      MCHC 34.6      RDW 13.4      Platelet Count 264      % Neutrophils 91      % Lymphocytes 4      % Monocytes 4      % Eosinophils 1      % Basophils 0      % Immature Granulocytes 0      NRBCs per 100 WBC 0      Absolute Neutrophils 10.7 (*)     Absolute Lymphocytes 0.4 (*)     Absolute Monocytes 0.5      Absolute Eosinophils 0.1      Absolute Basophils 0.0      Absolute Immature Granulocytes 0.0      Absolute NRBCs 0.0     LACTIC ACID WHOLE BLOOD - Normal    Lactic Acid 1.3     PROCALCITONIN - Normal    Procalcitonin 0.17     TROPONIN T, HIGH SENSITIVITY - Normal    Troponin T, High Sensitivity 19     COMPREHENSIVE METABOLIC PANEL    INFLUENZA A/B, RSV AND SARS-COV2 PCR   BLOOD CULTURE   BLOOD CULTURE   URINE CULTURE     RADIOLOGY    US Lower Extremity Venous Duplex Right   Final Result   IMPRESSION:   1.  No deep venous thrombosis in the right lower extremity.      CTA Head Neck with Contrast   Final Result   IMPRESSION:    HEAD CT:   No acute intracranial process.      HEAD CTA:   No large vessel occlusion or hemodynamically significant stenosis.      NECK CTA:   No large vessel occlusion or hemodynamically significant stenosis.             DISCHARGE MEDS  New Prescriptions    No medications on file         Aguila Hdz MD  Emergency Medicine  [facility] EMERGENCY ROOM  [facility] [facility] [id number]  [phone]     Aguila Hdz MD  04/30/25 0226

## 2025-04-30 NOTE — ED NOTES
Wife pressed call light concerned that pt was unable to tell her where he lives.  Having word finding difficulty.  Wife reports pt was alert and answering questions appropriately PTA.  Neuro exam performed, see chart. MD was made aware and is aware of this situation.  MD at bedside.

## 2025-04-30 NOTE — MEDICATION SCRIBE - ADMISSION MEDICATION HISTORY
Medication Scribe Admission Medication History    Admission medication history is complete. The information provided in this note is only as accurate as the sources available at the time of the update.    Information Source(s): Patient, Family member, Hospital records, and CareEverywhere/SureScripts via in-person    Pertinent Information: Spoke with patient and spouse for a medication history. Took AM scheduled medications and was given 1 g of acetaminophen, dose 2 of 2 of cefadroxil, and hydromorphone 4 mg at 1700 PTA.    Current notable medications: aspirin 81 mg BID, hydromorphone 4 mg Q4-6H PRN, cefadroxil 500 mg BID (through 5/6/2025), pregabalin 50 mg BID.     Changes made to PTA medication list:  Added:   Polyethylene glycol 17 g powder  Deleted: None  Changed:   Acetaminophen 325 mg 3 tab Q8H scheduled --> 500 mg 1-2 tab Q6H PRN (switched to extra strength)  Hydromorphone 2 mg 1-2 tab Q4H PRN --> 4 mg tabs 1 Q4-6H PRN per 4/26/2025 Rx SIG    Allergies reviewed with patient and updates made in EHR: yes    Medication History Completed By: Rajeev Prince 4/29/2025 10:38 PM    PTA Med List   Medication Sig Note Last Dose/Taking    acetaminophen (TYLENOL) 500 MG tablet Take 500-1,000 mg by mouth every 6 hours as needed for mild pain.  4/29/2025 at  5:00 PM    HYDROmorphone (DILAUDID) 4 MG tablet Take 4 mg by mouth every 4 hours as needed for pain. Directions: 1 tab (4mg) every 4-6 hours as needed for pain.  4/29/2025 at  5:00 PM    polyethylene glycol (MIRALAX) 17 GM/Dose powder Take 1 Capful by mouth every morning. (Taking scheduled post-op while taking opiate medication)  4/29/2025 Morning    amLODIPine (NORVASC) 5 MG tablet Take 5 mg by mouth daily.  4/29/2025 Morning    aspirin (ASA) 81 MG EC tablet Take 1 tablet (81 mg) by mouth 2 times daily.  4/29/2025 Morning    cefadroxil (DURICEF) 500 MG capsule Take 1 capsule (500 mg) by mouth 2 times daily. 4/29/2025: 4/22/25 x 14 days thru 5/6/25 4/29/2025 at   5:00 PM    Levomefolate Glucosamine (METHYLFOLATE) 400 MCG CAPS Take 1 capsule by mouth daily.  4/29/2025 Morning    pramipexole (MIRAPEX) 0.25 MG tablet Take 0.25-0.5 mg by mouth nightly as needed (restless legs).  4/28/2025 Bedtime    pregabalin (LYRICA) 50 MG capsule Take 1 capsule (50 mg) by mouth 2 times daily.  4/29/2025 Morning    rosuvastatin (CRESTOR) 40 MG tablet Take 40 mg by mouth at bedtime.  4/28/2025 Bedtime    senna-docusate (SENOKOT-S/PERICOLACE) 8.6-50 MG tablet Take 1 tablet by mouth 2 times daily.  4/29/2025 Morning    sertraline (ZOLOFT) 100 MG tablet Take 100 mg by mouth daily.  4/29/2025 Morning    tamsulosin (FLOMAX) 0.4 MG capsule Take 0.8 mg by mouth every evening.  4/28/2025 Evening    Vitamin D3 (CHOLECALCIFEROL) 25 mcg (1000 units) tablet Take 1 tablet by mouth daily.  4/29/2025 Morning

## 2025-05-01 ENCOUNTER — APPOINTMENT (OUTPATIENT)
Dept: PHYSICAL THERAPY | Facility: CLINIC | Age: 70
DRG: 864 | End: 2025-05-01
Attending: STUDENT IN AN ORGANIZED HEALTH CARE EDUCATION/TRAINING PROGRAM
Payer: COMMERCIAL

## 2025-05-01 VITALS
BODY MASS INDEX: 27.53 KG/M2 | SYSTOLIC BLOOD PRESSURE: 123 MMHG | DIASTOLIC BLOOD PRESSURE: 71 MMHG | WEIGHT: 192.3 LBS | RESPIRATION RATE: 16 BRPM | OXYGEN SATURATION: 95 % | HEART RATE: 76 BPM | HEIGHT: 70 IN | TEMPERATURE: 98 F

## 2025-05-01 LAB
BACTERIA BLD CULT: NORMAL
BACTERIA BLD CULT: NORMAL
BACTERIA UR CULT: ABNORMAL
CREAT SERPL-MCNC: 0.72 MG/DL (ref 0.67–1.17)
CRP SERPL-MCNC: 137 MG/L
EGFRCR SERPLBLD CKD-EPI 2021: >90 ML/MIN/1.73M2
ERYTHROCYTE [DISTWIDTH] IN BLOOD BY AUTOMATED COUNT: 13.5 % (ref 10–15)
HCT VFR BLD AUTO: 28.1 % (ref 40–53)
HGB BLD-MCNC: 9.8 G/DL (ref 13.3–17.7)
HOLD SPECIMEN: NORMAL
MAGNESIUM SERPL-MCNC: 2.1 MG/DL (ref 1.7–2.3)
MCH RBC QN AUTO: 30.9 PG (ref 26.5–33)
MCHC RBC AUTO-ENTMCNC: 34.9 G/DL (ref 31.5–36.5)
MCV RBC AUTO: 89 FL (ref 78–100)
PLATELET # BLD AUTO: 197 10E3/UL (ref 150–450)
POTASSIUM SERPL-SCNC: 3.6 MMOL/L (ref 3.4–5.3)
RBC # BLD AUTO: 3.17 10E6/UL (ref 4.4–5.9)
WBC # BLD AUTO: 5.3 10E3/UL (ref 4–11)

## 2025-05-01 PROCEDURE — 97161 PT EVAL LOW COMPLEX 20 MIN: CPT | Mod: GP

## 2025-05-01 PROCEDURE — 82565 ASSAY OF CREATININE: CPT | Performed by: STUDENT IN AN ORGANIZED HEALTH CARE EDUCATION/TRAINING PROGRAM

## 2025-05-01 PROCEDURE — 85014 HEMATOCRIT: CPT | Performed by: STUDENT IN AN ORGANIZED HEALTH CARE EDUCATION/TRAINING PROGRAM

## 2025-05-01 PROCEDURE — 999N000111 HC STATISTIC OT IP EVAL DEFER

## 2025-05-01 PROCEDURE — 250N000013 HC RX MED GY IP 250 OP 250 PS 637: Performed by: STUDENT IN AN ORGANIZED HEALTH CARE EDUCATION/TRAINING PROGRAM

## 2025-05-01 PROCEDURE — 250N000013 HC RX MED GY IP 250 OP 250 PS 637: Performed by: INTERNAL MEDICINE

## 2025-05-01 PROCEDURE — 36415 COLL VENOUS BLD VENIPUNCTURE: CPT

## 2025-05-01 PROCEDURE — 84132 ASSAY OF SERUM POTASSIUM: CPT | Performed by: INTERNAL MEDICINE

## 2025-05-01 PROCEDURE — 99239 HOSP IP/OBS DSCHRG MGMT >30: CPT | Performed by: STUDENT IN AN ORGANIZED HEALTH CARE EDUCATION/TRAINING PROGRAM

## 2025-05-01 PROCEDURE — 86140 C-REACTIVE PROTEIN: CPT

## 2025-05-01 PROCEDURE — 83735 ASSAY OF MAGNESIUM: CPT | Performed by: HOSPITALIST

## 2025-05-01 PROCEDURE — 250N000011 HC RX IP 250 OP 636: Performed by: STUDENT IN AN ORGANIZED HEALTH CARE EDUCATION/TRAINING PROGRAM

## 2025-05-01 RX ORDER — POTASSIUM CHLORIDE 1500 MG/1
20 TABLET, EXTENDED RELEASE ORAL ONCE
Status: COMPLETED | OUTPATIENT
Start: 2025-05-01 | End: 2025-05-01

## 2025-05-01 RX ORDER — PIPERACILLIN SODIUM, TAZOBACTAM SODIUM 4; .5 G/20ML; G/20ML
4.5 INJECTION, POWDER, LYOPHILIZED, FOR SOLUTION INTRAVENOUS EVERY 6 HOURS
Status: DISCONTINUED | OUTPATIENT
Start: 2025-05-01 | End: 2025-05-01 | Stop reason: HOSPADM

## 2025-05-01 RX ADMIN — HYDROCODONE BITARTRATE AND ACETAMINOPHEN 1 TABLET: 5; 325 TABLET ORAL at 11:14

## 2025-05-01 RX ADMIN — ASPIRIN 81 MG: 81 TABLET, COATED ORAL at 08:13

## 2025-05-01 RX ADMIN — AMLODIPINE BESYLATE 5 MG: 5 TABLET ORAL at 08:13

## 2025-05-01 RX ADMIN — POTASSIUM CHLORIDE 20 MEQ: 1500 TABLET, EXTENDED RELEASE ORAL at 08:13

## 2025-05-01 RX ADMIN — SERTRALINE 100 MG: 100 TABLET, FILM COATED ORAL at 08:13

## 2025-05-01 RX ADMIN — HYDROCODONE BITARTRATE AND ACETAMINOPHEN 1 TABLET: 5; 325 TABLET ORAL at 03:35

## 2025-05-01 RX ADMIN — PIPERACILLIN AND TAZOBACTAM 3.38 G: 3; .375 INJECTION, POWDER, FOR SOLUTION INTRAVENOUS at 08:13

## 2025-05-01 RX ADMIN — PREGABALIN 50 MG: 50 CAPSULE ORAL at 08:13

## 2025-05-01 RX ADMIN — VANCOMYCIN HYDROCHLORIDE 1000 MG: 1 INJECTION, SOLUTION INTRAVENOUS at 11:02

## 2025-05-01 RX ADMIN — PIPERACILLIN AND TAZOBACTAM 3.38 G: 3; .375 INJECTION, POWDER, FOR SOLUTION INTRAVENOUS at 03:23

## 2025-05-01 RX ADMIN — Medication 25 MCG: at 08:13

## 2025-05-01 ASSESSMENT — ACTIVITIES OF DAILY LIVING (ADL)
ADLS_ACUITY_SCORE: 34
ADLS_ACUITY_SCORE: 32
ADLS_ACUITY_SCORE: 34
ADLS_ACUITY_SCORE: 32
ADLS_ACUITY_SCORE: 32
ADLS_ACUITY_SCORE: 34
ADLS_ACUITY_SCORE: 34
ADLS_ACUITY_SCORE: 32
ADLS_ACUITY_SCORE: 34
ADLS_ACUITY_SCORE: 32
ADLS_ACUITY_SCORE: 34
ADLS_ACUITY_SCORE: 32

## 2025-05-01 NOTE — PROGRESS NOTES
"Orthopedic Progress Note      Assessment:    S/p Right STORM on 4/22 now with fever and UTI, initial encouner     Plan:   - Continue PT/OT  - Weightbearing status: WBAT  - CT reviewed and nothing to do at this time   - no drainage from the wound and no recurrent fever.   - UTI positive with pseudomonas, treatment per medicine  - Discharge planning: can discharge from ortho standpoint when medically clear      Subjective:  Pain: mild  Chest pain, SOB: no  Nausea, Vomiting:  no  Lightheadedness, Dizziness:  no  Neuro:  Patient denies new onset numbness or paresthesias    Patient is doing well this morning pain is well managed. States that the hip pain is much improved today.     Objective:  /71 (BP Location: Left arm)   Pulse 76   Temp 98  F (36.7  C) (Oral)   Resp 16   Ht 1.778 m (5' 10\")   Wt 87.2 kg (192 lb 4.8 oz)   SpO2 95%   BMI 27.59 kg/m    The patient is A&Ox3. Appears comfortable.   Sensation is intact.  Dorsiflexion and plantar flexion is intact.  Dorsalis pedis pulse intact.  Calves are soft and non-tender. Negative Deandre's.  The incision is covered. Dressing C/D/I.    Pertinent Labs   Lab Results: personally reviewed.   No results found for: \"INR\", \"PROTIME\"  Lab Results   Component Value Date    WBC 5.3 05/01/2025    HGB 9.8 (L) 05/01/2025    HCT 28.1 (L) 05/01/2025    MCV 89 05/01/2025     05/01/2025     Lab Results   Component Value Date     04/30/2025    CO2 22 04/30/2025         Report completed by:  Tamika Li PA-C/[facility]    Date: 5/1/2025  Time: 9:08 AM    "

## 2025-05-01 NOTE — PROGRESS NOTES
Discharge instructions provided to pt. All questions and concerns answered. Family to transport home.

## 2025-05-01 NOTE — DISCHARGE INSTRUCTIONS
Call orthopedic surgery or your primary care provider if your temperature is greater than 101 once or 100.4 for over an hour.    Continue to use incentive spirometry at home.

## 2025-05-01 NOTE — PLAN OF CARE
Problem: Adult Inpatient Plan of Care  Goal: Absence of Hospital-Acquired Illness or Injury  Intervention: Identify and Manage Fall Risk  Problem: Delirium  Goal: Improved Attention and Thought Clarity  Intervention: Maximize Cognitive Function   Problem: Adult Inpatient Plan of Care  Goal: Optimal Comfort and Wellbeing  Intervention: Monitor Pain and Promote Comfort    Goal Outcome Evaluation:  Patient is alert and oriented X 4. PRN Norco administered for pain control. Utilized ice packs for comfort. Tolerated IV antibiotics. Voided adequate amounts. IV infusing. VSS on RA. Bed alarms activated for safety.

## 2025-05-01 NOTE — PROGRESS NOTES
Occupational Therapy: Orders received. Chart reviewed and discussed with care team.? Occupational Therapy not indicated due to pt having recent hip surgery and is Mod I w/ ADLs, transfers, and mobility, per PT eval.? Defer discharge recommendations to PT and rest of care team.? Will complete orders.      A/P:  32yo admitted for elective iol PPD#1 s/p  @ 40wks. uncomplicated delivery and postpartum care,, pt stable   -Continue pain management  -DVT ppx: Encourage OOB and ambulation  -Continue current care  -Plan for discharge later today as long as baby is cleared   -d/w Dr. Rossi

## 2025-05-01 NOTE — DISCHARGE SUMMARY
"[facility]  Hospitalist Discharge Summary      Date of Admission:  4/29/2025  Date of Discharge:  5/1/2025  Discharging Provider: JINNY BELTRAN MD  Discharge Service: Hospitalist Service    Discharge Diagnoses   Fever  R LE swelling  S/p R hip arthroplasty  Urinary retention, previously s/p nation insertion and removal  Blood loss anemia - 11.7 (pre-op Hgb 13.8) .    HTN  HLD  RLS  Depression  Chronic low back pain, s/p spinal cord stimulator  Fibromyalgia    Clinically Significant Risk Factors     # Overweight: Estimated body mass index is 27.59 kg/m  as calculated from the following:    Height as of this encounter: 1.778 m (5' 10\").    Weight as of this encounter: 87.2 kg (192 lb 4.8 oz).       Follow-ups Needed After Discharge   Follow-up Appointments       Follow Up      Follow up with orthopedic surgery as planned.        Hospital Follow-up with Existing Primary Care Provider (PCP)          Schedule Primary Care visit within: 7 Days               Unresulted Labs Ordered in the Past 30 Days of this Admission       Date and Time Order Name Status Description    4/29/2025  8:00 PM Blood Culture Peripheral Blood Preliminary     4/29/2025  8:00 PM Blood Culture Arm, Right Preliminary         These results will be followed up by our group    Discharge Disposition   Discharged to home  Condition at discharge: Stable    Hospital Course   Patient presented to [facility] ED on 4/29 with fever 101. He had Right STORM on 4/22. The course was complicated with fever and UTI. In the ED, patient was started on ceftriaxone then switched to Zoysn and vanc (total of 2 days). He received CT hip and orthopedic surgery is consulted who recommends monitoring as an outpatient. Urine culture has no significant growth. Patient also denies dysuria. His fever may be due to atelectasis vs inflammation so incentive spirometry is started here. No recurrence of fever. Patient is advised to monitor his temp at " home.    Consultations This Hospital Stay   NEUROLOGY IP STROKE CONSULT  ORTHOPEDIC SURGERY IP CONSULT  PHARMACY TO DOSE VANCO  PHYSICAL THERAPY ADULT IP CONSULT  OCCUPATIONAL THERAPY ADULT IP CONSULT    Code Status   Full Code    Time Spent on this Encounter   I, JINNY BELTRAN MD, personally saw the patient today and spent greater than 30 minutes discharging this patient.       JINNY BELTRAN MD  [address] [address]  Phone: [phone]  Fax: [phone]  ______________________________________________________________________    Physical Exam   Vital Signs: Temp: 98  F (36.7  C) Temp src: Oral BP: 123/71 Pulse: 76   Resp: 16 SpO2: 95 % O2 Device: None (Room air)    Weight: 192 lbs 4.8 oz  General Appearance: Alert and wake, not in distress  Neurology: oriented x 3  Psych: cooperative and calm, normal affect         Primary Care Physician   Lise Anderson    Discharge Orders      Reason for your hospital stay    fever     Activity    Your activity upon discharge: activity as tolerated     Follow Up    Follow up with orthopedic surgery as planned.     Diet    Follow this diet upon discharge: Current Diet:Orders Placed This Encounter      Low Fat Diet (up to 50g)     Hospital Follow-up with Existing Primary Care Provider (PCP)            Significant Results and Procedures   Most Recent 3 CBC's:  Recent Labs   Lab Test 05/01/25  0556 04/30/25  0648 04/29/25 2021   WBC 5.3 9.5 11.8*   HGB 9.8* 10.3* 11.7*   MCV 89 88 89    202 264     Most Recent 3 BMP's:  Recent Labs   Lab Test 05/01/25  0556 04/30/25  0648 04/29/25 2021 04/23/25  0641   NA  --  136 134* 137   POTASSIUM 3.6 3.6 4.0 4.1   CHLORIDE  --  103 98 103   CO2  --  22 24 24   BUN  --  9.5 12.9 16.1   CR 0.72 0.70 0.86 0.72   ANIONGAP  --  11 12 10   DIMITRIS  --  9.1 9.7 8.7*   GLC  --  118* 104* 120*     Most Recent 2 LFT's:  Recent Labs   Lab Test 04/30/25  0648 04/29/25 2021   AST 58*  81*   ALT 59 69   ALKPHOS 62 74   BILITOTAL 0.5 0.5     Most Recent Urinalysis:  Recent Labs   Lab Test 04/29/25 2011   COLOR Light Yellow   APPEARANCE Clear   URINEGLC Negative   URINEBILI Negative   URINEKETONE Negative   SG 1.018   UBLD 0.1 mg/dL*   URINEPH 8.0*   PROTEIN Negative   NITRITE Negative   LEUKEST 25 Joseph/uL*   RBCU 31*   WBCU 32*   ,   Results for orders placed or performed during the hospital encounter of 04/29/25   US Lower Extremity Venous Duplex Right    Narrative    EXAM: US LOWER EXTREMITY VENOUS DUPLEX RIGHT  LOCATION: [facility]  DATE: 4/29/2025    INDICATION: leg pain, hip replacement  COMPARISON: None.  TECHNIQUE: Venous Duplex ultrasound of the right lower extremity with and without compression, augmentation and duplex. Color flow and spectral Doppler with waveform analysis performed.    FINDINGS: Exam includes the common femoral, femoral, popliteal, and contralateral common femoral veins as well as segmentally visualized deep calf veins and greater saphenous vein.     RIGHT: No deep vein thrombosis. No superficial thrombophlebitis. No popliteal cyst.      Impression    IMPRESSION:  1.  No deep venous thrombosis in the right lower extremity.   CTA Head Neck with Contrast    Narrative    EXAM: CTA HEAD NECK W CONTRAST  LOCATION: [facility]  DATE/TIME: 4/29/2025 11:18 PM CDT    INDICATION:  Altered mental status possible word finding problems  COMPARISON:  MRI brain 1/4/2024.  CONTRAST: Isovue 370 75 mL  TECHNIQUE: Head and neck CT angiogram with IV contrast. Noncontrast head CT followed by axial helical CT images of the head and neck vessels obtained during the arterial phase of intravenous contrast administration. Axial 2D reconstructed images and   multiplanar 3D MIP reconstructed images of the head and neck vessels were performed by the technologist. Dose reduction techniques were used. All stenosis measurements made according to NASCET  criteria unless otherwise specified.    FINDINGS:   NONCONTRAST HEAD CT:   INTRACRANIAL CONTENTS: No intracranial hemorrhage, extraaxial collection, or mass effect.  No CT evidence of acute infarct. Normal parenchymal attenuation. Normal ventricles and sulci.     VISUALIZED ORBITS/SINUSES/MASTOIDS: No intraorbital abnormality. No significant paranasal sinus mucosal disease. No middle ear or mastoid effusion.    BONES/SOFT TISSUES: No acute abnormality. Slight sclerosis corresponding to the enhancing right parietal calvarial lesion on the 1/4/2024 brain MRI. This is without aggressive features.    HEAD CTA:  ANTERIOR CIRCULATION: No stenosis/occlusion, aneurysm, or high flow vascular malformation. Standard [place] anatomy.    POSTERIOR CIRCULATION: No stenosis/occlusion, aneurysm, or high flow vascular malformation. Dominant right and smaller left vertebral artery contribute to a normal basilar artery.     DURAL VENOUS SINUSES: Expected enhancement of the major dural venous sinuses.    NECK CTA:  RIGHT CAROTID: No measurable stenosis or dissection.    LEFT CAROTID: No measurable stenosis or dissection.    VERTEBRAL ARTERIES: No focal stenosis or dissection. Dominant right and smaller left vertebral arteries.    AORTIC ARCH: Vascular variant aortic arch origin left vertebral artery.  No significant stenosis at the origin of the great vessels.    NONVASCULAR STRUCTURES:  Small right thyroid nodule, not meeting size criteria for follow-up. Multilevel cervical spondylosis.      Impression    IMPRESSION:   HEAD CT:  No acute intracranial process.    HEAD CTA:  No large vessel occlusion or hemodynamically significant stenosis.    NECK CTA:  No large vessel occlusion or hemodynamically significant stenosis.     CT Hip Right w Contrast    Narrative    EXAM: CT HIP RIGHT W CONTRAST  LOCATION: [facility]  DATE: 4/30/2025    INDICATION: S/p 2 weeks STORM now with fever and increased hip pain r/o  infection or abscess.  COMPARISON: None.  TECHNIQUE: Without and with IV contrast. Axial, sagittal and coronal thin-section reconstruction. Dose reduction techniques were used.   CONTRAST: Isovue 370, 90 mL.    FINDINGS:     -Postoperative changes right total hip arthroplasty. The components appear well seated. There is a lucent lesion within the proximal right femur anterior to the femoral endoprosthesis which extends to the superficial bone margin identified on series 3,   image 75. This is associated with an area of peripherally calcified fluid density anterior to the proximal femur which may represent early developing myositis ossificans but it would be difficult to exclude superimposed infection of the presumed subacute   hematoma. This is seen on series 3, image 86 and partially visualized on series 8, image 122. Because this is immediately adjacent to the bone lucency, an infectious process cannot be excluded. There is still a tiny amount of gas cephalad to this region   just deep to the proximal quadriceps musculature on series 3, image 51.    The remainder of the right hemipelvis is negative for fracture or focal bone lesion. Degenerative change at the symphysis pubis. There is some nonspecific edema or cellulitis lateral to the proximal thigh with a separate tiny fluid collection along the   incision line laterally on series 3, image 78. This measures 1.7 x 1.8 x 1.5 cm.      Impression    IMPRESSION:  1.  Postoperative changes of total hip arthroplasty. Components appear well seated.  2.  There is a focal area of lucency within the anterior aspect of the proximal right femur adjacent to the femoral endoprosthesis. This extends to the superficial bone margin where there is cortical irregularity.  3.  In addition, peripheral calcification extends from this region and encircles a fluid collection which may represent early developing myositis ossificans but superimposed infection cannot be excluded. This is  fairly fusiform in morphology and well   seen on sagittal series 8, image 123 and partially visualized on series 3, image 86.  4.  There is a tiny focus of gas which persists anterior to the proximal femur, deep to the proximal quadriceps musculature. This is abnormal for this phase of postoperative chronicity and also worrisome for infection.  5.  There is a separate tiny fluid collection along the incision line lateral to the right hip which measures 1.7 x 1.8 x 1.5 cm. This is not specific for abscess but this cannot be excluded.  6.  There is some edema or cellulitis lateral to the right hip.  7.  Intrapelvic contents are negative where seen.         Discharge Medications   Current Discharge Medication List        CONTINUE these medications which have NOT CHANGED    Details   acetaminophen (TYLENOL) 500 MG tablet Take 500-1,000 mg by mouth every 6 hours as needed for mild pain.      amLODIPine (NORVASC) 5 MG tablet Take 5 mg by mouth daily.      aspirin (ASA) 81 MG EC tablet Take 1 tablet (81 mg) by mouth 2 times daily.  Qty: 60 tablet, Refills: 0    Associated Diagnoses: History of hip surgery      cefadroxil (DURICEF) 500 MG capsule Take 1 capsule (500 mg) by mouth 2 times daily.  Qty: 28 capsule, Refills: 0    Associated Diagnoses: History of hip surgery; Post-traumatic osteoarthritis of right hip      HYDROmorphone (DILAUDID) 4 MG tablet Take 4 mg by mouth every 4 hours as needed for pain. Directions: 1 tab (4mg) every 4-6 hours as needed for pain.      Levomefolate Glucosamine (METHYLFOLATE) 400 MCG CAPS Take 1 capsule by mouth daily.      polyethylene glycol (MIRALAX) 17 GM/Dose powder Take 1 Capful by mouth every morning. (Taking scheduled post-op while taking opiate medication)      pramipexole (MIRAPEX) 0.25 MG tablet Take 0.25-0.5 mg by mouth nightly as needed (restless legs).      pregabalin (LYRICA) 50 MG capsule Take 1 capsule (50 mg) by mouth 2 times daily.  Qty: 60 capsule, Refills: 0     Associated Diagnoses: Fibromyalgia      rosuvastatin (CRESTOR) 40 MG tablet Take 40 mg by mouth at bedtime.      senna-docusate (SENOKOT-S/PERICOLACE) 8.6-50 MG tablet Take 1 tablet by mouth 2 times daily.  Qty: 60 tablet, Refills: 0    Associated Diagnoses: History of hip surgery      sertraline (ZOLOFT) 100 MG tablet Take 100 mg by mouth daily.      tamsulosin (FLOMAX) 0.4 MG capsule Take 0.8 mg by mouth every evening.      Vitamin D3 (CHOLECALCIFEROL) 25 mcg (1000 units) tablet Take 1 tablet by mouth daily.           7-Day Micro Results       Collected Updated Procedure Result Status      04/30/2025 1459 04/30/2025 2034 MRSA MSSA PCR, Nasal Swab [[id number]]    Swab from Nares, Bilateral    Final result Component Value   MRSA Target DNA Negative   SA Target DNA Negative            04/30/2025 0548 04/30/2025 0636 Influenza A/B, RSV and SARS-CoV2 PCR (COVID-19) Nasopharyngeal [[id number]]    Swab from Nasopharyngeal    Final result Component Value   Influenza A PCR Negative   Influenza B PCR Negative   RSV PCR Negative   SARS CoV2 PCR Negative   NEGATIVE: SARS-CoV-2 (COVID-19) RNA not detected, presumed negative.            04/29/2025 2056 04/30/2025 2316 Blood Culture Arm, Right [[id number]]   Blood from Arm, Right    Preliminary result Component Value   Culture No growth after 1 day  [P]                04/29/2025 2021 04/30/2025 2316 Blood Culture Peripheral Blood [[id number]]   Peripheral Blood    Preliminary result Component Value   Culture No growth after 1 day  [P]                04/29/2025 2011 05/01/2025 0250 Urine Culture [[id number]]    (Abnormal)   Urine, Midstream    Final result Component Value   Culture 10,000-50,000 CFU/mL Pseudomonas aeruginosa        Susceptibility        Pseudomonas aeruginosa      DORIS      Cefepime 2 ug/mL Susceptible      Ceftazidime 2 ug/mL Susceptible      Ciprofloxacin 0.25 ug/mL Susceptible      Levofloxacin 1 ug/mL Susceptible      Meropenem <=0.25 ug/mL  Susceptible      Piperacillin/Tazobactam 16 ug/mL Susceptible                                    Allergies   No Known Allergies

## 2025-05-01 NOTE — PROGRESS NOTES
05/01/25 1314   Appointment Info   Signing Clinician's Name / Credentials (PT) Steven Escobar PT   Quick Adds   Quick Adds Certification   Living Environment   People in Home spouse   Current Living Arrangements house;other (see comments)   Home Accessibility stairs to enter home   Number of Stairs, Main Entrance 3   Stair Railings, Main Entrance none   Number of Stairs, Within Home, Primary none   Transportation Anticipated family or friend will provide   Self-Care   Usual Activity Tolerance good   Current Activity Tolerance good   Regular Exercise Yes   Activity/Exercise Type biking   Equipment Currently Used at Home cane, straight   Fall history within last six months no   Activity/Exercise/Self-Care Comment Indep with all I ADL's and ADL's   General Information   Onset of Illness/Injury or Date of Surgery 04/29/25   Referring Physician Sandra Pate MD   Patient/Family Therapy Goals Statement (PT) return to biking   Pertinent History of Current Problem (include personal factors and/or comorbidities that impact the POC) Confusion 4/29/2025    Hip pain, right 4/29/2025    Urinary tract infection without hematuria, site unspecified 4/29/2025,  R STORM 4-22-25   Existing Precautions/Restrictions no hip IR;no hip ADD past midline;90 degree hip flexion   Weight-Bearing Status - RLE weight-bearing as tolerated   Cognition   Affect/Mental Status (Cognition) WNL   Range of Motion (ROM)   Range of Motion ROM is WFL   Strength (Manual Muscle Testing)   Strength (Manual Muscle Testing) No deficits observed during functional mobility   Transfers   Transfers sit-stand transfer   Sit-Stand Transfer   Sit-Stand [place] (Transfers) modified independence   Assistive Device (Sit-Stand Transfers) walker, front-wheeled   Gait/Stairs (Locomotion)   [place] Level (Gait) modified independence   Assistive Device (Gait) walker, front-wheeled   Distance in Feet (Gait) 200   Pattern (Gait) swing-through   Negotiation (Stairs)  stairs independence;stairs assistive device;handrail location;number of steps;ascending technique;descending technique;maintains weight-bearing status   [place] Level (Stairs) supervision   Handrail Location (Stairs) both sides   Number of Steps (Stairs) 4   Ascending Technique (Stairs) step-to-step   Descending Technique (Stairs) step-to-step   Balance   Balance no deficits were identified   Clinical Impression   Criteria for Skilled Therapeutic Intervention Evaluation only   PT Total Evaluation Time   PT Eval, Low Complexity Minutes (64171) 10   Therapy Certification   Start of care date 05/01/25   Certification date from 05/01/25   Certification date to 05/01/25   PT Discharge Planning   PT Plan d/c PT   PT Discharge Recommendation (DC Rec) home with assist   PT Rationale for DC Rec Pt Mod I for transfers and ambulation with FWW. SBA for stairs with use of cane and rail (mimic wife assist). Good home setup and support.   PT Brief overview of current status Mod I transfers and ambulation FWW, SBA stairs   PT Total Distance Amb During Session (feet) 200   PT Equipment Needed at Discharge walker, rolling   Physical Therapy Time and Intention   Total Session Time (sum of timed and untimed services) 10

## 2025-05-01 NOTE — PROGRESS NOTES
Physical Therapy Discharge Summary    Reason for therapy discharge:    All goals and outcomes met, no further needs identified.    Progress towards therapy goal(s). See goals on Care Plan in [facility] electronic health record for goal details.  Goals met    Therapy recommendation(s):    Continue home exercise program.       duplicate

## 2025-05-04 LAB
BACTERIA BLD CULT: NO GROWTH
BACTERIA BLD CULT: NO GROWTH

## 2025-07-12 ENCOUNTER — HEALTH MAINTENANCE LETTER (OUTPATIENT)
Age: 70
End: 2025-07-12

## (undated) DEVICE — HOLDER LIMB VELCRO OR 0814-1533

## (undated) DEVICE — SUCTION MANIFOLD NEPTUNE 2 SYS 4 PORT 0702-020-000

## (undated) DEVICE — BONE CLEANING TIP INTERPULSE  0210-010-000

## (undated) DEVICE — SU STRATAFIX PDS PLUS 1 CT-1 18" SXPP1A404

## (undated) DEVICE — CUSTOM PACK TOTAL HIP SOP5BTHHEA

## (undated) DEVICE — PREP CHLORAPREP W/ORANGE TINT 10.5ML 930715

## (undated) DEVICE — SOL NACL 0.9% IRRIG 3000ML BAG 2B7127

## (undated) DEVICE — HOOD SURG T7 PLUS STRL LF DISP 0416-801-200

## (undated) DEVICE — DRSG FOAM MEPILEX BORDER SILVER 4X10 POSTOP 498450

## (undated) DEVICE — SOL WATER IRRIG 1000ML BOTTLE 2F7114

## (undated) DEVICE — GLOVE BIOGEL PI INDICATOR 8.0 LF 41680

## (undated) DEVICE — GLOVE BIOGEL PI ORTHOPRO SZ 7.5 47675

## (undated) DEVICE — PAD HIP POSITIONING MCGUIRE 707-CPM

## (undated) DEVICE — SOL NACL 0.9% IRRIG 1000ML BOTTLE 2F7124

## (undated) DEVICE — SUTURE MONOCRYL 3-0 18 PS2 UND MCP497G

## (undated) DEVICE — SUCTION IRR SYSTEM W/O TIP INTERPULSE HANDPIECE 0210-100-000

## (undated) DEVICE — ELECTRODE PATIENT RETURN ADULT L10 FT 2 PLATE CORD 0855C

## (undated) DEVICE — ESU ELEC BLADE 6" COATED E1450-6

## (undated) DEVICE — HOOD SURG T7PLUS PEEL AWAY FACE SHIELD STRL LF 0416-801-100

## (undated) DEVICE — SUTURE VICRYL+ 2-0 27IN CT-1 UND VCP259H

## (undated) DEVICE — GOWN IMPERVIOUS BREATHABLE SMART XLG 89045

## (undated) DEVICE — SU VICRYL+ 0 27IN CT-1 UND VCP260H

## (undated) DEVICE — DRAPE IOBAN INCISE 36X23" 6651EZ

## (undated) DEVICE — BLADE SAGITTAL WIDE (SO-618) 2108-118

## (undated) DEVICE — SU ETHIBOND 5 V-37 4X30" MB66G

## (undated) RX ORDER — PHENYLEPHRINE HYDROCHLORIDE 10 MG/ML
INJECTION INTRAVENOUS
Status: DISPENSED
Start: 2025-04-22

## (undated) RX ORDER — PROPOFOL 10 MG/ML
INJECTION, EMULSION INTRAVENOUS
Status: DISPENSED
Start: 2025-04-22

## (undated) RX ORDER — FENTANYL CITRATE 50 UG/ML
INJECTION, SOLUTION INTRAMUSCULAR; INTRAVENOUS
Status: DISPENSED
Start: 2025-04-22

## (undated) RX ORDER — DEXAMETHASONE SODIUM PHOSPHATE 10 MG/ML
INJECTION, EMULSION INTRAMUSCULAR; INTRAVENOUS
Status: DISPENSED
Start: 2025-04-22

## (undated) RX ORDER — LIDOCAINE HYDROCHLORIDE 10 MG/ML
INJECTION, SOLUTION EPIDURAL; INFILTRATION; INTRACAUDAL; PERINEURAL
Status: DISPENSED
Start: 2025-04-22

## (undated) RX ORDER — EPHEDRINE SULFATE 50 MG/ML
INJECTION, SOLUTION INTRAMUSCULAR; INTRAVENOUS; SUBCUTANEOUS
Status: DISPENSED
Start: 2025-04-22

## (undated) RX ORDER — ONDANSETRON 2 MG/ML
INJECTION INTRAMUSCULAR; INTRAVENOUS
Status: DISPENSED
Start: 2025-04-22